# Patient Record
Sex: FEMALE | Race: WHITE | NOT HISPANIC OR LATINO
[De-identification: names, ages, dates, MRNs, and addresses within clinical notes are randomized per-mention and may not be internally consistent; named-entity substitution may affect disease eponyms.]

---

## 2017-05-02 ENCOUNTER — RESULT REVIEW (OUTPATIENT)
Age: 32
End: 2017-05-02

## 2017-05-03 ENCOUNTER — APPOINTMENT (OUTPATIENT)
Dept: DERMATOLOGY | Facility: CLINIC | Age: 32
End: 2017-05-03

## 2017-05-24 ENCOUNTER — APPOINTMENT (OUTPATIENT)
Dept: DERMATOLOGY | Facility: CLINIC | Age: 32
End: 2017-05-24

## 2017-06-30 ENCOUNTER — APPOINTMENT (OUTPATIENT)
Dept: DERMATOLOGY | Facility: CLINIC | Age: 32
End: 2017-06-30

## 2017-07-07 ENCOUNTER — APPOINTMENT (OUTPATIENT)
Dept: OBGYN | Facility: CLINIC | Age: 32
End: 2017-07-07

## 2017-07-07 VITALS
HEART RATE: 71 BPM | HEIGHT: 67 IN | WEIGHT: 155 LBS | SYSTOLIC BLOOD PRESSURE: 110 MMHG | DIASTOLIC BLOOD PRESSURE: 77 MMHG | BODY MASS INDEX: 24.33 KG/M2

## 2017-07-10 LAB — HPV HIGH+LOW RISK DNA PNL CVX: NEGATIVE

## 2017-07-12 LAB — CYTOLOGY CVX/VAG DOC THIN PREP: NORMAL

## 2017-09-05 ENCOUNTER — APPOINTMENT (OUTPATIENT)
Dept: DERMATOLOGY | Facility: CLINIC | Age: 32
End: 2017-09-05
Payer: MEDICAID

## 2017-09-05 PROCEDURE — 17110 DESTRUCTION B9 LES UP TO 14: CPT

## 2017-09-05 PROCEDURE — 99213 OFFICE O/P EST LOW 20 MIN: CPT | Mod: 25

## 2017-09-19 ENCOUNTER — RESULT REVIEW (OUTPATIENT)
Age: 32
End: 2017-09-19

## 2017-09-19 ENCOUNTER — INPATIENT (INPATIENT)
Facility: HOSPITAL | Age: 32
LOS: 0 days | Discharge: HOME | DRG: 694 | End: 2017-09-20
Attending: INTERNAL MEDICINE | Admitting: INTERNAL MEDICINE
Payer: MEDICAID

## 2017-09-19 VITALS
DIASTOLIC BLOOD PRESSURE: 81 MMHG | SYSTOLIC BLOOD PRESSURE: 126 MMHG | WEIGHT: 149.91 LBS | TEMPERATURE: 98 F | OXYGEN SATURATION: 100 % | HEART RATE: 82 BPM | HEIGHT: 67 IN | RESPIRATION RATE: 18 BRPM

## 2017-09-19 DIAGNOSIS — N23 UNSPECIFIED RENAL COLIC: ICD-10-CM

## 2017-09-19 DIAGNOSIS — R10.9 UNSPECIFIED ABDOMINAL PAIN: ICD-10-CM

## 2017-09-19 DIAGNOSIS — Z98.89 OTHER SPECIFIED POSTPROCEDURAL STATES: Chronic | ICD-10-CM

## 2017-09-19 LAB
ANION GAP SERPL CALC-SCNC: 11 MMOL/L — SIGNIFICANT CHANGE UP (ref 5–17)
APPEARANCE UR: ABNORMAL
BACTERIA # UR AUTO: ABNORMAL
BASOPHILS # BLD AUTO: 0 K/UL — SIGNIFICANT CHANGE UP (ref 0–0.2)
BASOPHILS NFR BLD AUTO: 0.4 % — SIGNIFICANT CHANGE UP (ref 0–2)
BILIRUB UR-MCNC: NEGATIVE — SIGNIFICANT CHANGE UP
BUN SERPL-MCNC: 12 MG/DL — SIGNIFICANT CHANGE UP (ref 8–20)
CALCIUM SERPL-MCNC: 9.2 MG/DL — SIGNIFICANT CHANGE UP (ref 8.6–10.2)
CHLORIDE SERPL-SCNC: 102 MMOL/L — SIGNIFICANT CHANGE UP (ref 98–107)
CO2 SERPL-SCNC: 26 MMOL/L — SIGNIFICANT CHANGE UP (ref 22–29)
COLOR SPEC: ABNORMAL
CREAT SERPL-MCNC: 0.75 MG/DL — SIGNIFICANT CHANGE UP (ref 0.5–1.3)
DIFF PNL FLD: ABNORMAL
EOSINOPHIL # BLD AUTO: 0.1 K/UL — SIGNIFICANT CHANGE UP (ref 0–0.5)
EOSINOPHIL NFR BLD AUTO: 1.6 % — SIGNIFICANT CHANGE UP (ref 0–6)
EPI CELLS # UR: SIGNIFICANT CHANGE UP
GLUCOSE SERPL-MCNC: 100 MG/DL — SIGNIFICANT CHANGE UP (ref 70–115)
GLUCOSE UR QL: NEGATIVE MG/DL — SIGNIFICANT CHANGE UP
HCG SERPL-ACNC: <2 MIU/ML — SIGNIFICANT CHANGE UP
HCT VFR BLD CALC: 38 % — SIGNIFICANT CHANGE UP (ref 37–47)
HGB BLD-MCNC: 13 G/DL — SIGNIFICANT CHANGE UP (ref 12–16)
KETONES UR-MCNC: ABNORMAL
LEUKOCYTE ESTERASE UR-ACNC: ABNORMAL
LYMPHOCYTES # BLD AUTO: 1.7 K/UL — SIGNIFICANT CHANGE UP (ref 1–4.8)
LYMPHOCYTES # BLD AUTO: 22.4 % — SIGNIFICANT CHANGE UP (ref 20–55)
MCHC RBC-ENTMCNC: 31.6 PG — HIGH (ref 27–31)
MCHC RBC-ENTMCNC: 34.2 G/DL — SIGNIFICANT CHANGE UP (ref 32–36)
MCV RBC AUTO: 92.2 FL — SIGNIFICANT CHANGE UP (ref 81–99)
MONOCYTES # BLD AUTO: 0.5 K/UL — SIGNIFICANT CHANGE UP (ref 0–0.8)
MONOCYTES NFR BLD AUTO: 7.2 % — SIGNIFICANT CHANGE UP (ref 3–10)
NEUTROPHILS # BLD AUTO: 5.1 K/UL — SIGNIFICANT CHANGE UP (ref 1.8–8)
NEUTROPHILS NFR BLD AUTO: 68.3 % — SIGNIFICANT CHANGE UP (ref 37–73)
NITRITE UR-MCNC: POSITIVE
PH UR: 6.5 — SIGNIFICANT CHANGE UP (ref 5–8)
PLATELET # BLD AUTO: 195 K/UL — SIGNIFICANT CHANGE UP (ref 150–400)
POTASSIUM SERPL-MCNC: 4.1 MMOL/L — SIGNIFICANT CHANGE UP (ref 3.5–5.3)
POTASSIUM SERPL-SCNC: 4.1 MMOL/L — SIGNIFICANT CHANGE UP (ref 3.5–5.3)
PROT UR-MCNC: 100 MG/DL
RBC # BLD: 4.12 M/UL — LOW (ref 4.4–5.2)
RBC # FLD: 12.5 % — SIGNIFICANT CHANGE UP (ref 11–15.6)
RBC CASTS # UR COMP ASSIST: >50 /HPF (ref 0–4)
SODIUM SERPL-SCNC: 139 MMOL/L — SIGNIFICANT CHANGE UP (ref 135–145)
SP GR SPEC: 1.01 — SIGNIFICANT CHANGE UP (ref 1.01–1.02)
UROBILINOGEN FLD QL: 1 MG/DL
WBC # BLD: 7.5 K/UL — SIGNIFICANT CHANGE UP (ref 4.8–10.8)
WBC # FLD AUTO: 7.5 K/UL — SIGNIFICANT CHANGE UP (ref 4.8–10.8)
WBC UR QL: SIGNIFICANT CHANGE UP

## 2017-09-19 PROCEDURE — 74176 CT ABD & PELVIS W/O CONTRAST: CPT | Mod: 26

## 2017-09-19 PROCEDURE — 99222 1ST HOSP IP/OBS MODERATE 55: CPT

## 2017-09-19 PROCEDURE — 88300 SURGICAL PATH GROSS: CPT | Mod: 26

## 2017-09-19 PROCEDURE — 99284 EMERGENCY DEPT VISIT MOD MDM: CPT | Mod: 25

## 2017-09-19 RX ORDER — MORPHINE SULFATE 50 MG/1
2 CAPSULE, EXTENDED RELEASE ORAL ONCE
Qty: 0 | Refills: 0 | Status: DISCONTINUED | OUTPATIENT
Start: 2017-09-19 | End: 2017-09-19

## 2017-09-19 RX ORDER — KETOROLAC TROMETHAMINE 30 MG/ML
30 SYRINGE (ML) INJECTION ONCE
Qty: 0 | Refills: 0 | Status: DISCONTINUED | OUTPATIENT
Start: 2017-09-19 | End: 2017-09-19

## 2017-09-19 RX ORDER — SODIUM CHLORIDE 9 MG/ML
1000 INJECTION, SOLUTION INTRAVENOUS
Qty: 0 | Refills: 0 | Status: DISCONTINUED | OUTPATIENT
Start: 2017-09-19 | End: 2017-09-20

## 2017-09-19 RX ORDER — SODIUM CHLORIDE 9 MG/ML
1000 INJECTION INTRAMUSCULAR; INTRAVENOUS; SUBCUTANEOUS ONCE
Qty: 0 | Refills: 0 | Status: COMPLETED | OUTPATIENT
Start: 2017-09-19 | End: 2017-09-19

## 2017-09-19 RX ORDER — OXYCODONE HYDROCHLORIDE 5 MG/1
10 TABLET ORAL EVERY 4 HOURS
Qty: 0 | Refills: 0 | Status: DISCONTINUED | OUTPATIENT
Start: 2017-09-19 | End: 2017-09-20

## 2017-09-19 RX ORDER — TAMSULOSIN HYDROCHLORIDE 0.4 MG/1
0.8 CAPSULE ORAL ONCE
Qty: 0 | Refills: 0 | Status: COMPLETED | OUTPATIENT
Start: 2017-09-19 | End: 2017-09-19

## 2017-09-19 RX ORDER — HYDROMORPHONE HYDROCHLORIDE 2 MG/ML
1 INJECTION INTRAMUSCULAR; INTRAVENOUS; SUBCUTANEOUS ONCE
Qty: 0 | Refills: 0 | Status: DISCONTINUED | OUTPATIENT
Start: 2017-09-19 | End: 2017-09-19

## 2017-09-19 RX ORDER — MORPHINE SULFATE 50 MG/1
3 CAPSULE, EXTENDED RELEASE ORAL EVERY 4 HOURS
Qty: 0 | Refills: 0 | Status: DISCONTINUED | OUTPATIENT
Start: 2017-09-19 | End: 2017-09-20

## 2017-09-19 RX ORDER — OXYCODONE HYDROCHLORIDE 5 MG/1
5 TABLET ORAL EVERY 4 HOURS
Qty: 0 | Refills: 0 | Status: DISCONTINUED | OUTPATIENT
Start: 2017-09-19 | End: 2017-09-20

## 2017-09-19 RX ORDER — INFLUENZA VIRUS VACCINE 15; 15; 15; 15 UG/.5ML; UG/.5ML; UG/.5ML; UG/.5ML
0.5 SUSPENSION INTRAMUSCULAR ONCE
Qty: 0 | Refills: 0 | Status: COMPLETED | OUTPATIENT
Start: 2017-09-19 | End: 2017-09-19

## 2017-09-19 RX ORDER — ONDANSETRON 8 MG/1
4 TABLET, FILM COATED ORAL EVERY 6 HOURS
Qty: 0 | Refills: 0 | Status: DISCONTINUED | OUTPATIENT
Start: 2017-09-19 | End: 2017-09-20

## 2017-09-19 RX ORDER — CEFTRIAXONE 500 MG/1
1 INJECTION, POWDER, FOR SOLUTION INTRAMUSCULAR; INTRAVENOUS EVERY 24 HOURS
Qty: 0 | Refills: 0 | Status: DISCONTINUED | OUTPATIENT
Start: 2017-09-19 | End: 2017-09-20

## 2017-09-19 RX ORDER — CEFTRIAXONE 500 MG/1
1 INJECTION, POWDER, FOR SOLUTION INTRAMUSCULAR; INTRAVENOUS ONCE
Qty: 0 | Refills: 0 | Status: COMPLETED | OUTPATIENT
Start: 2017-09-19 | End: 2017-09-19

## 2017-09-19 RX ORDER — MORPHINE SULFATE 50 MG/1
4 CAPSULE, EXTENDED RELEASE ORAL ONCE
Qty: 0 | Refills: 0 | Status: DISCONTINUED | OUTPATIENT
Start: 2017-09-19 | End: 2017-09-19

## 2017-09-19 RX ORDER — TAMSULOSIN HYDROCHLORIDE 0.4 MG/1
0.8 CAPSULE ORAL AT BEDTIME
Qty: 0 | Refills: 0 | Status: DISCONTINUED | OUTPATIENT
Start: 2017-09-19 | End: 2017-09-20

## 2017-09-19 RX ORDER — METOCLOPRAMIDE HCL 10 MG
10 TABLET ORAL ONCE
Qty: 0 | Refills: 0 | Status: COMPLETED | OUTPATIENT
Start: 2017-09-19 | End: 2017-09-19

## 2017-09-19 RX ORDER — ONDANSETRON 8 MG/1
4 TABLET, FILM COATED ORAL ONCE
Qty: 0 | Refills: 0 | Status: COMPLETED | OUTPATIENT
Start: 2017-09-19 | End: 2017-09-19

## 2017-09-19 RX ORDER — MORPHINE SULFATE 50 MG/1
4 CAPSULE, EXTENDED RELEASE ORAL EVERY 4 HOURS
Qty: 0 | Refills: 0 | Status: DISCONTINUED | OUTPATIENT
Start: 2017-09-19 | End: 2017-09-19

## 2017-09-19 RX ADMIN — Medication 30 MILLIGRAM(S): at 08:07

## 2017-09-19 RX ADMIN — ONDANSETRON 4 MILLIGRAM(S): 8 TABLET, FILM COATED ORAL at 20:50

## 2017-09-19 RX ADMIN — MORPHINE SULFATE 2 MILLIGRAM(S): 50 CAPSULE, EXTENDED RELEASE ORAL at 09:59

## 2017-09-19 RX ADMIN — Medication 10 MILLIGRAM(S): at 17:40

## 2017-09-19 RX ADMIN — OXYCODONE HYDROCHLORIDE 10 MILLIGRAM(S): 5 TABLET ORAL at 17:40

## 2017-09-19 RX ADMIN — ONDANSETRON 4 MILLIGRAM(S): 8 TABLET, FILM COATED ORAL at 08:07

## 2017-09-19 RX ADMIN — MORPHINE SULFATE 4 MILLIGRAM(S): 50 CAPSULE, EXTENDED RELEASE ORAL at 08:08

## 2017-09-19 RX ADMIN — MORPHINE SULFATE 2 MILLIGRAM(S): 50 CAPSULE, EXTENDED RELEASE ORAL at 11:01

## 2017-09-19 RX ADMIN — ONDANSETRON 4 MILLIGRAM(S): 8 TABLET, FILM COATED ORAL at 14:05

## 2017-09-19 RX ADMIN — MORPHINE SULFATE 3 MILLIGRAM(S): 50 CAPSULE, EXTENDED RELEASE ORAL at 21:06

## 2017-09-19 RX ADMIN — Medication 30 MILLIGRAM(S): at 08:08

## 2017-09-19 RX ADMIN — HYDROMORPHONE HYDROCHLORIDE 1 MILLIGRAM(S): 2 INJECTION INTRAMUSCULAR; INTRAVENOUS; SUBCUTANEOUS at 13:39

## 2017-09-19 RX ADMIN — MORPHINE SULFATE 3 MILLIGRAM(S): 50 CAPSULE, EXTENDED RELEASE ORAL at 20:51

## 2017-09-19 RX ADMIN — SODIUM CHLORIDE 150 MILLILITER(S): 9 INJECTION, SOLUTION INTRAVENOUS at 17:41

## 2017-09-19 RX ADMIN — SODIUM CHLORIDE 1000 MILLILITER(S): 9 INJECTION INTRAMUSCULAR; INTRAVENOUS; SUBCUTANEOUS at 08:07

## 2017-09-19 RX ADMIN — MORPHINE SULFATE 4 MILLIGRAM(S): 50 CAPSULE, EXTENDED RELEASE ORAL at 15:13

## 2017-09-19 RX ADMIN — HYDROMORPHONE HYDROCHLORIDE 1 MILLIGRAM(S): 2 INJECTION INTRAMUSCULAR; INTRAVENOUS; SUBCUTANEOUS at 11:46

## 2017-09-19 RX ADMIN — TAMSULOSIN HYDROCHLORIDE 0.8 MILLIGRAM(S): 0.4 CAPSULE ORAL at 11:46

## 2017-09-19 RX ADMIN — MORPHINE SULFATE 4 MILLIGRAM(S): 50 CAPSULE, EXTENDED RELEASE ORAL at 14:05

## 2017-09-19 RX ADMIN — TAMSULOSIN HYDROCHLORIDE 0.8 MILLIGRAM(S): 0.4 CAPSULE ORAL at 20:58

## 2017-09-19 RX ADMIN — SODIUM CHLORIDE 1000 MILLILITER(S): 9 INJECTION INTRAMUSCULAR; INTRAVENOUS; SUBCUTANEOUS at 13:40

## 2017-09-19 RX ADMIN — CEFTRIAXONE 100 GRAM(S): 500 INJECTION, POWDER, FOR SOLUTION INTRAMUSCULAR; INTRAVENOUS at 09:55

## 2017-09-19 NOTE — CONSULT NOTE ADULT - PROBLEM SELECTOR RECOMMENDATION 9
1.  IV rocephin, swithch to po abx tomorrow  2.  IVF  3.  FLomax  4.  pain control  5.  FU UC  6.  If has fever or the pain doesn't resolve she may require a ureteral stent  7. Will follow

## 2017-09-19 NOTE — ED STATDOCS - OBJECTIVE STATEMENT
31 year old female, with PMHx of kidney stone 6 years ago, presenting to the ED complaining of right flank pain. Pt states that she also has had episodes of vomiting. She states that her sx feel similar to her prior hx of kidney stone. Pt denies pregnancy. She states that she is not on any medications currently. Pt states that she does not smoke, drink alcohol, or use illicit drugs. She denies neck pain, ear pain, eye pain, HA, abdominal pain, throat pain, or chest pain. No further complaints at this time. 31 year old female, with PMHx of kidney stone 6 years ago, presenting to the ED complaining of right flank pain. She rates her pain at 10/10. Pt states that she also has had episodes of vomiting. She states that her sx feel similar to her prior hx of kidney stone. Pt denies pregnancy. She states that she is not on any medications currently. Pt states that she does not smoke, drink alcohol, or use illicit drugs. She denies neck pain, ear pain, eye pain, HA, abdominal pain, throat pain, or chest pain. No further complaints at this time.

## 2017-09-19 NOTE — ED ADULT TRIAGE NOTE - CHIEF COMPLAINT QUOTE
"I think I have a kidney stone on my right side. I had them before and I've been vomiting since this morning.  I have the chills" LMP two weeks ago

## 2017-09-19 NOTE — ED STATDOCS - PROGRESS NOTE DETAILS
patient re-evaluated c/o R flank pain x 1 day, woke up with pain and nausea and "dry heaving."  Patient has h/o stones x 7 years ago and reports pain feels "exactly the same way."  She denies any fevers or chills, recent travel or rashes or sick contacts.  PE: +RCVAT, no masses or hernias, +BS x4, soft.  Pending labs, UA, and CT to eval for hydro/obstructing kidney stone. patient states toradol never works for her requesting morphine. patient now rates pain at 5/10. patients labs and UA reviewed, pending CT, UA +nitrates/blood, hematuria likely from renal pathology, will tx with 1G rocephin and send UC CT +3mm obstructing renal stone in UVJ, UA nitrate + concern for infected stone, patient requesting more pain medication.  Will consult urology. spoke to urology shayy, no indication for stent, as patient has no WBC or fever, recommend patient to be given option to be admitted for observation or d/c home with abx, pain meds, flomax, if symptoms worsen return to ED.

## 2017-09-19 NOTE — H&P ADULT - NSHPPHYSICALEXAM_GEN_ALL_CORE
PHYSICAL EXAMINATION:  GENERAL: NAD, Alert and Oriented x 3 HEENT:  Normocephalic and atraumatic.  Extraocular muscles are intact.  NECK: Supple.  - JVD.  CARDIOVASCULAR: RRR S1, S2.  LUNGS: CTAB, - rales, - wheezing, - rhonchi.  BACK: - CVA tenderness.  ABDOMEN: Soft, - tenderness, - distension, + BS.  EXTREMITIES: - cyanosis, - clubbing, - edema.  NEUROLOGIC: strength is symmetric, sensation intact, speech fluent.  PSYCHIATRIC: Calm.  - agitation.    SKIN: - rashes, - lesions.

## 2017-09-19 NOTE — CONSULT NOTE ADULT - SUBJECTIVE AND OBJECTIVE BOX
HPI: Patient with acute right flank pain.  Pain was 10/10 in severity.  The pain was responsive to IV pain medication.  No fevers.  + chills.  Feels slightly better in the ED.  Had eswl 14years ago.        PAST MEDICAL & SURGICAL HISTORY:  ADD (attention deficit disorder)  Kidney stone  S/P       REVIEW OF SYSTEMS:     Reviewed ED ROS    MEDICATIONS  (STANDING):    MEDICATIONS  (PRN):      Allergies    No Known Allergies    Intolerances        SOCIAL HISTORY:    FAMILY HISTORY:      Vital Signs Last 24 Hrs  T(C): 36.6 (19 Sep 2017 07:28), Max: 36.6 (19 Sep 2017 07:28)  T(F): 97.8 (19 Sep 2017 07:28), Max: 97.8 (19 Sep 2017 07:28)  HR: 82 (19 Sep 2017 07:28) (82 - 82)  BP: 126/81 (19 Sep 2017 07:28) (126/81 - 126/81)  BP(mean): --  RR: 18 (19 Sep 2017 07:28) (18 - 18)  SpO2: 100% (19 Sep 2017 07:28) (100% - 100%)    PHYSICAL EXAM:     abd- soft, nd, bs+, Mild ruq tenderness and flank tenderness    LABS:                        13.0   7.5   )-----------( 195      ( 19 Sep 2017 08:17 )             38.0     -    139  |  102  |  12.0  ----------------------------<  100  4.1   |  26.0  |  0.75    Ca    9.2      19 Sep 2017 08:17        Urinalysis Basic - ( 19 Sep 2017 09:14 )    Color: Lucia / Appearance: x / S.015 / pH: x  Gluc: x / Ketone: Trace  / Bili: Negative / Urobili: 1 mg/dL   Blood: x / Protein: 100 mg/dL / Nitrite: Positive   Leuk Esterase: Trace / RBC: >50 /HPF / WBC 3-5   Sq Epi: x / Non Sq Epi: Few / Bacteria: Few        RADIOLOGY & ADDITIONAL STUDIES:

## 2017-09-19 NOTE — ED STATDOCS - CHPI ED SYMPTOM NEG
NO NECK PAIN, NO EAR PAIN, NO THROAT PAIN, NO EYE PAIN, NO HEADACHE, NO ABDOMINAL PAIN, NO CHEST PAIN

## 2017-09-19 NOTE — H&P ADULT - HISTORY OF PRESENT ILLNESS
31y Female PMH ADHD, renal stones presents c/o R flank pain x 2 days, progressively worsening, associated with subjective chills and nausea.  No diarrhea.  no exacerbating or relieving factors.  Symptoms similar to her prior experiences of renal stones per pt.  Noted on CT with R 3mm ureteral stone. UA + nitrates.  No additional complaints.     FAMILY HISTORY: reviewed and negative.  SOCIAL HISTORY: - alcohol, - IVDA, - smoking.  REVIEW OF SYSTEMS: General: - fatigue, - weight loss, - fevers, - chills.  HEENT: - headache, - hearing disturbances.  EYES: - visual disturbances, - diplopia.  CARDIOVASCULAR: - chest pain, - palpitations.  PULMONARY: - SOB, - cough, - hemoptysis.  GI: - abdominal pain, - nausea, - vomiting, - diarrhea, - constipation.  : - urinary urgency, - frequency, - dysuria.  MUSCULOSKELETAL: - arthralgias, - myalgias.  NEURO:  - weakness, - numbness.  PSYCH: - depression, - anxiety, - suicidal thoughts. SKIN: - rashes, - lesions.  Allergies    No Known Allergies    Intolerances

## 2017-09-19 NOTE — H&P ADULT - NSHPLABSRESULTS_GEN_ALL_CORE
VITALS:  Vital Signs Last 24 Hrs  T(C): 36.6 (19 Sep 2017 07:28), Max: 36.6 (19 Sep 2017 07:28)  T(F): 97.8 (19 Sep 2017 07:28), Max: 97.8 (19 Sep 2017 07:28)  HR: 82 (19 Sep 2017 07:28) (82 - 82)  BP: 126/81 (19 Sep 2017 07:28) (126/81 - 126/81)  BP(mean): --  RR: 18 (19 Sep 2017 07:28) (18 - 18)  SpO2: 100% (19 Sep 2017 07:28) (100% - 100%) Daily Height in cm: 170.18 (19 Sep 2017 07:28)    Daily   CAPILLARY BLOOD GLUCOSE        I&O's Summary      LABS:                        13.0   7.5   )-----------( 195      ( 19 Sep 2017 08:17 )             38.0     09-    139  |  102  |  12.0  ----------------------------<  100  4.1   |  26.0  |  0.75    Ca    9.2      19 Sep 2017 08:17          Urinalysis Basic - ( 19 Sep 2017 09:14 )    Color: Lucia / Appearance: x / S.015 / pH: x  Gluc: x / Ketone: Trace  / Bili: Negative / Urobili: 1 mg/dL   Blood: x / Protein: 100 mg/dL / Nitrite: Positive   Leuk Esterase: Trace / RBC: >50 /HPF / WBC 3-5   Sq Epi: x / Non Sq Epi: Few / Bacteria: Few

## 2017-09-19 NOTE — H&P ADULT - ASSESSMENT
> R ureteral stone / UTI - urology input appreciated.  Started Flomax, IVF.  Pain control with oxycodone, Morphine prn.  Possible further intervention if symptoms do not improve.  Checked cultures.  > ADHD - off medications.  Observation.  > DVT Proph OOB.

## 2017-09-19 NOTE — ED STATDOCS - ATTENDING CONTRIBUTION TO CARE
I, Rojas Ga, performed the initial face to face bedside interview with this patient regarding history of present illness, review of symptoms and relevant past medical, social and family history.  I completed an independent physical examination.  I was the initial provider who evaluated this patient. I have signed out the follow up of any pending tests (i.e. labs, radiological studies) to the ACP.  I have communicated the patient’s plan of care and disposition with the ACP.

## 2017-09-20 ENCOUNTER — TRANSCRIPTION ENCOUNTER (OUTPATIENT)
Age: 32
End: 2017-09-20

## 2017-09-20 VITALS — TEMPERATURE: 98 F

## 2017-09-20 LAB
ANION GAP SERPL CALC-SCNC: 9 MMOL/L — SIGNIFICANT CHANGE UP (ref 5–17)
BUN SERPL-MCNC: 13 MG/DL — SIGNIFICANT CHANGE UP (ref 8–20)
CALCIUM SERPL-MCNC: 8.3 MG/DL — LOW (ref 8.6–10.2)
CHLORIDE SERPL-SCNC: 104 MMOL/L — SIGNIFICANT CHANGE UP (ref 98–107)
CO2 SERPL-SCNC: 25 MMOL/L — SIGNIFICANT CHANGE UP (ref 22–29)
CREAT SERPL-MCNC: 0.81 MG/DL — SIGNIFICANT CHANGE UP (ref 0.5–1.3)
CULTURE RESULTS: SIGNIFICANT CHANGE UP
GLUCOSE SERPL-MCNC: 101 MG/DL — SIGNIFICANT CHANGE UP (ref 70–115)
HCT VFR BLD CALC: 32 % — LOW (ref 37–47)
HGB BLD-MCNC: 10.6 G/DL — LOW (ref 12–16)
MCHC RBC-ENTMCNC: 31.3 PG — HIGH (ref 27–31)
MCHC RBC-ENTMCNC: 33.1 G/DL — SIGNIFICANT CHANGE UP (ref 32–36)
MCV RBC AUTO: 94.4 FL — SIGNIFICANT CHANGE UP (ref 81–99)
PLATELET # BLD AUTO: 139 K/UL — LOW (ref 150–400)
POTASSIUM SERPL-MCNC: 3.9 MMOL/L — SIGNIFICANT CHANGE UP (ref 3.5–5.3)
POTASSIUM SERPL-SCNC: 3.9 MMOL/L — SIGNIFICANT CHANGE UP (ref 3.5–5.3)
RBC # BLD: 3.39 M/UL — LOW (ref 4.4–5.2)
RBC # FLD: 12.6 % — SIGNIFICANT CHANGE UP (ref 11–15.6)
SODIUM SERPL-SCNC: 138 MMOL/L — SIGNIFICANT CHANGE UP (ref 135–145)
SPECIMEN SOURCE: SIGNIFICANT CHANGE UP
WBC # BLD: 5.7 K/UL — SIGNIFICANT CHANGE UP (ref 4.8–10.8)
WBC # FLD AUTO: 5.7 K/UL — SIGNIFICANT CHANGE UP (ref 4.8–10.8)

## 2017-09-20 PROCEDURE — 99238 HOSP IP/OBS DSCHRG MGMT 30/<: CPT

## 2017-09-20 RX ORDER — MOXIFLOXACIN HYDROCHLORIDE TABLETS, 400 MG 400 MG/1
1 TABLET, FILM COATED ORAL
Qty: 14 | Refills: 0
Start: 2017-09-20 | End: 2017-09-27

## 2017-09-20 RX ORDER — TAMSULOSIN HYDROCHLORIDE 0.4 MG/1
2 CAPSULE ORAL
Qty: 20 | Refills: 0
Start: 2017-09-20 | End: 2017-09-30

## 2017-09-20 RX ORDER — SACCHAROMYCES BOULARDII 250 MG
1 POWDER IN PACKET (EA) ORAL
Qty: 20 | Refills: 0
Start: 2017-09-20 | End: 2017-09-30

## 2017-09-20 RX ORDER — OXYCODONE HYDROCHLORIDE 5 MG/1
1 TABLET ORAL
Qty: 20 | Refills: 0
Start: 2017-09-20

## 2017-09-20 RX ADMIN — CEFTRIAXONE 100 GRAM(S): 500 INJECTION, POWDER, FOR SOLUTION INTRAMUSCULAR; INTRAVENOUS at 10:10

## 2017-09-20 RX ADMIN — SODIUM CHLORIDE 150 MILLILITER(S): 9 INJECTION, SOLUTION INTRAVENOUS at 05:34

## 2017-09-20 RX ADMIN — OXYCODONE HYDROCHLORIDE 5 MILLIGRAM(S): 5 TABLET ORAL at 10:10

## 2017-09-20 RX ADMIN — OXYCODONE HYDROCHLORIDE 5 MILLIGRAM(S): 5 TABLET ORAL at 10:50

## 2017-09-20 NOTE — DISCHARGE NOTE ADULT - PATIENT PORTAL LINK FT
“You can access the Follow[employer] Patient Portal, offered by [facility], by registering with the following website: [email or web address]”

## 2017-09-20 NOTE — PROGRESS NOTE ADULT - SUBJECTIVE AND OBJECTIVE BOX
INTERVAL HPI/OVERNIGHT EVENTS: Feels well.  Minimal pain.  No n/v.    MEDICATIONS  (STANDING):  tamsulosin 0.8 milliGRAM(s) Oral at bedtime  sodium chloride 0.45%. 1000 milliLiter(s) (150 mL/Hr) IV Continuous <Continuous>  cefTRIAXone   IVPB 1 Gram(s) IV Intermittent every 24 hours  influenza   Vaccine 0.5 milliLiter(s) IntraMuscular once    MEDICATIONS  (PRN):  ondansetron Injectable 4 milliGRAM(s) IV Push every 6 hours PRN Nausea and/or Vomiting  morphine  - Injectable 3 milliGRAM(s) IV Push every 4 hours PRN Breakthrough pain  oxyCODONE    IR 5 milliGRAM(s) Oral every 4 hours PRN mild - moderate pain  oxyCODONE    IR 10 milliGRAM(s) Oral every 4 hours PRN Severe Pain (7 - 10)      Allergies    No Known Allergies    Intolerances        Vital Signs Last 24 Hrs  T(C): 36.5 (20 Sep 2017 00:29), Max: 36.6 (19 Sep 2017 07:28)  T(F): 97.7 (20 Sep 2017 00:29), Max: 97.8 (19 Sep 2017 07:28)  HR: 75 (20 Sep 2017 00:29) (60 - 82)  BP: 90/47 (20 Sep 2017 00:29) (90/47 - 126/81)  BP(mean): --  RR: 18 (20 Sep 2017 00:29) (18 - 18)  SpO2: 98% (20 Sep 2017 00:29) (98% - 100%)     ON PE:  General: alert and awake  Abdomen: soft, nd, bs+, mild ruq tenderness       LABS:                        13.0   7.5   )-----------( 195      ( 19 Sep 2017 08:17 )             38.0     -    139  |  102  |  12.0  ----------------------------<  100  4.1   |  26.0  |  0.75    Ca    9.2      19 Sep 2017 08:17        Urinalysis Basic - ( 19 Sep 2017 09:14 )    Color: Lucia / Appearance: x / S.015 / pH: x  Gluc: x / Ketone: Trace  / Bili: Negative / Urobili: 1 mg/dL   Blood: x / Protein: 100 mg/dL / Nitrite: Positive   Leuk Esterase: Trace / RBC: >50 /HPF / WBC 3-5   Sq Epi: x / Non Sq Epi: Few / Bacteria: Few        RADIOLOGY & ADDITIONAL TESTS:

## 2017-09-20 NOTE — DISCHARGE NOTE ADULT - HOSPITAL COURSE
Patient: HAMILTON MOTLEY [id number]                 Internal Medicine Hospitalist Discharge Note - Dr. Arnold Coto    HPI:  31y Female PMH ADHD, renal stones presents c/o R flank pain x 2 days, progressively worsening, associated with subjective chills and nausea.  No diarrhea.  no exacerbating or relieving factors.  Symptoms similar to her prior experiences of renal stones per pt.  Noted on CT with R 3mm ureteral stone. UA + nitrates.  Responded well to medical management.  Seen with mother at bedside.  Pt reports passing a small stone overnight.  Symptoms significantly improved.      ____________________PHYSICAL EXAM:  Vitals reviewed as indicated below  GENERAL:  NAD Alert and Oriented x 3   HEENT: NCAT  CARDIOVASCULAR:  S1, S2  LUNGS: CTAB  ABDOMEN:  soft, (-) tenderness, (-) distension, (+) bowel sounds, (-) guarding, (-) rebound (-) rigidity  EXTREMITIES:  no cyanosis / clubbing / edema.   ____________________    VITALS:  Vital Signs Last 24 Hrs  T(C): 36.6 (20 Sep 2017 08:34), Max: 36.6 (20 Sep 2017 08:34)  T(F): 97.8 (20 Sep 2017 08:34), Max: 97.8 (20 Sep 2017 08:34)  HR: 62 (20 Sep 2017 08:34) (60 - 75)  BP: 90/60 (20 Sep 2017 08:34) (90/47 - 99/64)  BP(mean): --  RR: 18 (20 Sep 2017 08:34) (18 - 18)  SpO2: 99% (20 Sep 2017 08:34) (98% - 100%) Daily     Daily   CAPILLARY BLOOD GLUCOSE        I&O's Summary    19 Sep 2017 07:  -  20 Sep 2017 07:00  --------------------------------------------------------  IN: 150 mL / OUT: 0 mL / NET: 150 mL    20 Sep 2017 07:01  -  20 Sep 2017 10:31  --------------------------------------------------------  IN: 500 mL / OUT: 0 mL / NET: 500 mL        LABS:                        13.0   7.5   )-----------( 195      ( 19 Sep 2017 08:17 )             38.0     09-20    138  |  104  |  13.0  ----------------------------<  101  3.9   |  25.0  |  0.81    Ca    8.3<L>      20 Sep 2017 08:30            Urinalysis Basic - ( 19 Sep 2017 09:14 )    Color: Lucia / Appearance: x / S.015 / pH: x  Gluc: x / Ketone: Trace  / Bili: Negative / Urobili: 1 mg/dL   Blood: x / Protein: 100 mg/dL / Nitrite: Positive   Leuk Esterase: Trace / RBC: >50 /HPF / WBC 3-5   Sq Epi: x / Non Sq Epi: Few / Bacteria: Few          > R ureteral stone / UTI - Urology f/u noted.  Outpatient f/u for stone analysis results.  Continue Cipro.  Advised not to become pregnant on medication.    > ADHD - off medications.  Observation.  Disposition: stable for discharge.  Outpatient followup as above.  Patient was advised to return if they experience any recurrence or worsening of symptoms.    -Arnold Coto D.O., Hospitalist, [facility]

## 2017-09-20 NOTE — DISCHARGE NOTE ADULT - CARE PLAN
Principal Discharge DX:	Kidney stone  Goal:	stable for discharge  Instructions for follow-up, activity and diet:	It is important to see your primary physician as well as the physicians noted below within the next week to perform a comprehensive medical review.  Call their offices for an appointment as soon as you leave the hospital.  If you do not have a primary physician, contact the [facility] [place] [phone] located on [address] [place] [address].  Your medical issues appear to be stable at this time, but if your symptoms recur or worsen, contact your physicians and/or return to the hospital if necessary.  If you encounter any issues or questions with your medication, call your physicians before stopping the medication.  Use caution with oxycodone as it may include somnolence and impaired cognition, especially if used in combination with other medications.  Do not drive / operate machinery / etc while on this medication.  Secondary Diagnosis:	Attention deficit disorder, unspecified hyperactivity presence  Secondary Diagnosis:	Urinary tract infection without hematuria, site unspecified

## 2017-09-20 NOTE — PROGRESS NOTE ADULT - PROBLEM SELECTOR PLAN 1
pain control as needed  po abx x 5 days  discharge home  FU with me in the office in two weeks  will sign off

## 2017-09-20 NOTE — DISCHARGE NOTE ADULT - CARE PROVIDER_API CALL
Arnold Estrada), Family Medicine  [address] [place] [address]  Phone: [phone]  Fax: [phone]    Vazquez Verde), Urology  [address] [place] [id number]  Phone: [phone]  Fax: [phone]

## 2017-09-20 NOTE — DISCHARGE NOTE ADULT - MEDICATION SUMMARY - MEDICATIONS TO TAKE
I will START or STAY ON the medications listed below when I get home from the hospital:    oxyCODONE 5 mg oral tablet  -- 1 tab(s) by mouth every 6 hours, As Needed - moderate - severe pain MDD:4  -- Indication: For Pain    tamsulosin 0.4 mg oral capsule  -- 2 cap(s) by mouth once a day (at bedtime)  -- Indication: For Renal colic    Adderall  --  by mouth   -- Indication: For ADHD    Florastor 250 mg oral capsule  -- 1 cap(s) by mouth 2 times a day   -- Indication: For Supplement    Cipro 500 mg oral tablet  -- 1 tab(s) by mouth every 12 hours   -- Avoid prolonged or excessive exposure to direct and/or artificial sunlight while taking this medication.  Check with your doctor before becoming pregnant.  Do not take dairy products, antacids, or iron preparations within one hour of this medication.  Finish all this medication unless otherwise directed by prescriber.  Medication should be taken with plenty of water.    -- Indication: For UTI

## 2017-09-20 NOTE — DISCHARGE NOTE ADULT - SECONDARY DIAGNOSIS.
Attention deficit disorder, unspecified hyperactivity presence Urinary tract infection without hematuria, site unspecified

## 2017-09-20 NOTE — DISCHARGE NOTE ADULT - PLAN OF CARE
stable for discharge It is important to see your primary physician as well as the physicians noted below within the next week to perform a comprehensive medical review.  Call their offices for an appointment as soon as you leave the hospital.  If you do not have a primary physician, contact the [facility] at [place] [phone] located on [address] [place] [address].  Your medical issues appear to be stable at this time, but if your symptoms recur or worsen, contact your physicians and/or return to the hospital if necessary.  If you encounter any issues or questions with your medication, call your physicians before stopping the medication.  Use caution with oxycodone as it may include somnolence and impaired cognition, especially if used in combination with other medications.  Do not drive / operate machinery / etc while on this medication.

## 2017-09-27 LAB
COMPN STONE: SIGNIFICANT CHANGE UP
SURGICAL PATHOLOGY FINAL REPORT - CH: SIGNIFICANT CHANGE UP

## 2017-09-27 PROCEDURE — 96376 TX/PRO/DX INJ SAME DRUG ADON: CPT

## 2017-09-27 PROCEDURE — 99285 EMERGENCY DEPT VISIT HI MDM: CPT | Mod: 25

## 2017-09-27 PROCEDURE — 74176 CT ABD & PELVIS W/O CONTRAST: CPT

## 2017-09-27 PROCEDURE — 96375 TX/PRO/DX INJ NEW DRUG ADDON: CPT

## 2017-09-27 PROCEDURE — 80048 BASIC METABOLIC PNL TOTAL CA: CPT

## 2017-09-27 PROCEDURE — 82365 CALCULUS SPECTROSCOPY: CPT

## 2017-09-27 PROCEDURE — 87086 URINE CULTURE/COLONY COUNT: CPT

## 2017-09-27 PROCEDURE — 84702 CHORIONIC GONADOTROPIN TEST: CPT

## 2017-09-27 PROCEDURE — 88300 SURGICAL PATH GROSS: CPT

## 2017-09-27 PROCEDURE — 85027 COMPLETE CBC AUTOMATED: CPT

## 2017-09-27 PROCEDURE — 81001 URINALYSIS AUTO W/SCOPE: CPT

## 2017-09-27 PROCEDURE — 36415 COLL VENOUS BLD VENIPUNCTURE: CPT

## 2017-09-27 PROCEDURE — 96374 THER/PROPH/DIAG INJ IV PUSH: CPT

## 2017-09-29 LAB — PATH REPORT ADDENDUM.SYNOPTIC DOC: SIGNIFICANT CHANGE UP

## 2017-10-27 ENCOUNTER — APPOINTMENT (OUTPATIENT)
Dept: DERMATOLOGY | Facility: CLINIC | Age: 32
End: 2017-10-27
Payer: MEDICAID

## 2017-10-27 PROCEDURE — 99213 OFFICE O/P EST LOW 20 MIN: CPT

## 2018-02-05 ENCOUNTER — APPOINTMENT (OUTPATIENT)
Dept: OBGYN | Facility: CLINIC | Age: 33
End: 2018-02-05
Payer: MEDICAID

## 2018-02-05 VITALS
WEIGHT: 155 LBS | DIASTOLIC BLOOD PRESSURE: 82 MMHG | HEIGHT: 67 IN | SYSTOLIC BLOOD PRESSURE: 126 MMHG | BODY MASS INDEX: 24.33 KG/M2

## 2018-02-05 DIAGNOSIS — N97.9 FEMALE INFERTILITY, UNSPECIFIED: ICD-10-CM

## 2018-02-05 PROCEDURE — 99213 OFFICE O/P EST LOW 20 MIN: CPT

## 2018-05-09 ENCOUNTER — APPOINTMENT (OUTPATIENT)
Dept: DERMATOLOGY | Facility: CLINIC | Age: 33
End: 2018-05-09

## 2018-05-30 ENCOUNTER — APPOINTMENT (OUTPATIENT)
Dept: NEUROLOGY | Facility: CLINIC | Age: 33
End: 2018-05-30

## 2018-07-08 ENCOUNTER — EMERGENCY (EMERGENCY)
Facility: HOSPITAL | Age: 33
LOS: 1 days | Discharge: HOME | End: 2018-07-08
Attending: EMERGENCY MEDICINE
Payer: MEDICAID

## 2018-07-08 VITALS — WEIGHT: 149.91 LBS | HEIGHT: 67 IN

## 2018-07-08 DIAGNOSIS — Z98.89 OTHER SPECIFIED POSTPROCEDURAL STATES: Chronic | ICD-10-CM

## 2018-07-08 PROCEDURE — 99283 EMERGENCY DEPT VISIT LOW MDM: CPT | Mod: 25

## 2018-07-09 VITALS
RESPIRATION RATE: 16 BRPM | SYSTOLIC BLOOD PRESSURE: 114 MMHG | DIASTOLIC BLOOD PRESSURE: 85 MMHG | OXYGEN SATURATION: 100 % | TEMPERATURE: 98 F | HEART RATE: 85 BPM

## 2018-07-09 PROCEDURE — 99283 EMERGENCY DEPT VISIT LOW MDM: CPT

## 2018-07-09 RX ORDER — PSEUDOEPHEDRINE HCL 30 MG
1 TABLET ORAL
Qty: 8 | Refills: 0
Start: 2018-07-09 | End: 2018-07-12

## 2018-07-09 RX ORDER — PSEUDOEPHEDRINE HCL 30 MG
30 TABLET ORAL ONCE
Qty: 0 | Refills: 0 | Status: COMPLETED | OUTPATIENT
Start: 2018-07-09 | End: 2018-07-09

## 2018-07-09 RX ADMIN — Medication 1 TABLET(S): at 02:55

## 2018-07-09 RX ADMIN — Medication 30 MILLIGRAM(S): at 02:55

## 2018-07-09 NOTE — ED PROVIDER NOTE - NS ED ROS FT

## 2018-07-09 NOTE — ED PROVIDER NOTE - OBJECTIVE STATEMENT
PT with no SPMHx presents to the ED with complaint of Lt sided ear pain x1 wk. Pt states that she went to [facility] was placed on PO amoxicillin and ear drops with little to no improvement 5 days ago, pt was seen at PCP 3 days ago taken off PO abx given pain medication told to go to ED if worse, pt went to [facility] earlier today for pain was given ear wick that helped with pain but fell out at home, pt states that pain is sever at this time. PT states that pain improved positionally PT states that pain radiates all around her head, denies fever, chills, n/v, weka ness, numbness, acute change in hearing, change in vision, skin changes.

## 2018-07-09 NOTE — ED PROVIDER NOTE - ATTENDING CONTRIBUTION TO CARE
I personally saw the patient with the PA, and completed the key components of the history and physical exam. I then discussed the management plan with the PA.   gen in nad resp clear cardaic no murmur abd soft heeent + left OE no mastoid erythema swelling

## 2018-07-09 NOTE — ED PROVIDER NOTE - MEDICAL DECISION MAKING DETAILS
PT with stable vs, non toxic appearing tolerating PO in the ed with large amount of swelling in external canal will place wick, continue abx drops add po medications dc home with follow up to PCP, ENT, educated about when to return to the ED if needed. PT verbalizes that he understands all instructions and results.

## 2018-07-09 NOTE — ED PROVIDER NOTE - LEFT EAR
EXTERNAL CANAL INFLAMMATION/unable to visualize all ansotomy due to large amount of swelling, no drainages, no mastoid tenderness or inflammation

## 2018-07-10 ENCOUNTER — APPOINTMENT (OUTPATIENT)
Dept: OTOLARYNGOLOGY | Facility: CLINIC | Age: 33
End: 2018-07-10
Payer: MEDICAID

## 2018-07-10 VITALS
SYSTOLIC BLOOD PRESSURE: 107 MMHG | HEART RATE: 79 BPM | HEIGHT: 67 IN | WEIGHT: 155 LBS | BODY MASS INDEX: 24.33 KG/M2 | DIASTOLIC BLOOD PRESSURE: 75 MMHG

## 2018-07-10 DIAGNOSIS — Z87.442 PERSONAL HISTORY OF URINARY CALCULI: ICD-10-CM

## 2018-07-10 DIAGNOSIS — Z98.891 HISTORY OF UTERINE SCAR FROM PREVIOUS SURGERY: ICD-10-CM

## 2018-07-10 PROCEDURE — 99203 OFFICE O/P NEW LOW 30 MIN: CPT

## 2018-07-10 RX ORDER — AMOXICILLIN 875 MG/1
875 TABLET, FILM COATED ORAL
Qty: 20 | Refills: 0 | Status: DISCONTINUED | COMMUNITY
Start: 2018-07-05

## 2018-07-10 RX ORDER — DICLOXACILLIN SODIUM 500 MG/1
500 CAPSULE ORAL
Qty: 40 | Refills: 0 | Status: DISCONTINUED | COMMUNITY
Start: 2018-07-07

## 2018-07-10 RX ORDER — AMITRIPTYLINE HYDROCHLORIDE 25 MG/1
25 TABLET, FILM COATED ORAL
Qty: 30 | Refills: 0 | Status: DISCONTINUED | COMMUNITY
Start: 2018-01-24

## 2018-07-10 RX ORDER — AMOXICILLIN 500 MG/1
500 CAPSULE ORAL
Qty: 20 | Refills: 0 | Status: DISCONTINUED | COMMUNITY
Start: 2018-05-10

## 2018-07-17 ENCOUNTER — APPOINTMENT (OUTPATIENT)
Dept: OTOLARYNGOLOGY | Facility: CLINIC | Age: 33
End: 2018-07-17
Payer: MEDICAID

## 2018-07-17 VITALS
WEIGHT: 150 LBS | HEART RATE: 80 BPM | HEIGHT: 66 IN | SYSTOLIC BLOOD PRESSURE: 112 MMHG | BODY MASS INDEX: 24.11 KG/M2 | DIASTOLIC BLOOD PRESSURE: 73 MMHG

## 2018-07-17 PROCEDURE — 99213 OFFICE O/P EST LOW 20 MIN: CPT

## 2018-07-17 RX ORDER — LEVONORGESTREL AND ETHINYL ESTRADIOL 6-5-10
50-30/75-40/ KIT ORAL DAILY
Qty: 90 | Refills: 3 | Status: DISCONTINUED | COMMUNITY
Start: 2017-09-18 | End: 2018-07-17

## 2018-07-17 RX ORDER — NEOMYCIN AND POLYMYXIN B SULFATES AND DEXAMETHASONE 3.5; 10000; 1 MG/G; [IU]/G; MG/G
3.5-10000-0.1 OINTMENT OPHTHALMIC
Qty: 35 | Refills: 0 | Status: DISCONTINUED | COMMUNITY
Start: 2018-03-25

## 2018-07-17 RX ORDER — NORTRIPTYLINE HYDROCHLORIDE 25 MG/1
25 CAPSULE ORAL
Qty: 30 | Refills: 0 | Status: DISCONTINUED | COMMUNITY
Start: 2018-03-01

## 2018-07-17 RX ORDER — DULOXETINE HYDROCHLORIDE 60 MG/1
60 CAPSULE, DELAYED RELEASE PELLETS ORAL
Qty: 30 | Refills: 0 | Status: DISCONTINUED | COMMUNITY
Start: 2018-05-01

## 2018-07-17 RX ORDER — METOCLOPRAMIDE 10 MG/1
10 TABLET ORAL
Qty: 12 | Refills: 0 | Status: DISCONTINUED | COMMUNITY
Start: 2018-07-09

## 2018-07-17 RX ORDER — ONDANSETRON 4 MG/1
4 TABLET, ORALLY DISINTEGRATING ORAL
Qty: 12 | Refills: 0 | Status: DISCONTINUED | COMMUNITY
Start: 2018-07-08

## 2018-07-17 RX ORDER — AMOXICILLIN AND CLAVULANATE POTASSIUM 875; 125 MG/1; MG/1
875-125 TABLET, COATED ORAL
Qty: 14 | Refills: 0 | Status: DISCONTINUED | COMMUNITY
Start: 2018-07-09 | End: 2018-07-17

## 2018-07-17 RX ORDER — OXYCODONE AND ACETAMINOPHEN 5; 325 MG/1; MG/1
5-325 TABLET ORAL
Qty: 10 | Refills: 0 | Status: DISCONTINUED | COMMUNITY
Start: 2018-07-07

## 2018-07-17 RX ORDER — TRAMADOL HYDROCHLORIDE 50 MG/1
50 TABLET, COATED ORAL
Qty: 30 | Refills: 0 | Status: DISCONTINUED | COMMUNITY
Start: 2018-07-07

## 2018-07-17 RX ORDER — NEOMYCIN AND POLYMYXIN B SULFATES AND HYDROCORTISONE OTIC 10; 3.5; 1 MG/ML; MG/ML; [USP'U]/ML
3.5-10000-1 SUSPENSION AURICULAR (OTIC)
Qty: 10 | Refills: 0 | Status: DISCONTINUED | COMMUNITY
Start: 2018-07-08

## 2018-10-01 ENCOUNTER — APPOINTMENT (OUTPATIENT)
Dept: MATERNAL FETAL MEDICINE | Facility: CLINIC | Age: 33
End: 2018-10-01

## 2018-10-02 ENCOUNTER — LABORATORY RESULT (OUTPATIENT)
Age: 33
End: 2018-10-02

## 2018-10-04 ENCOUNTER — LABORATORY RESULT (OUTPATIENT)
Age: 33
End: 2018-10-04

## 2018-10-05 ENCOUNTER — APPOINTMENT (OUTPATIENT)
Dept: OBGYN | Facility: CLINIC | Age: 33
End: 2018-10-05
Payer: MEDICAID

## 2018-10-05 VITALS
DIASTOLIC BLOOD PRESSURE: 68 MMHG | HEIGHT: 66 IN | SYSTOLIC BLOOD PRESSURE: 116 MMHG | BODY MASS INDEX: 25.23 KG/M2 | HEART RATE: 81 BPM | WEIGHT: 157 LBS

## 2018-10-05 LAB
HCG UR QL: POSITIVE
QUALITY CONTROL: YES

## 2018-10-05 PROCEDURE — 76817 TRANSVAGINAL US OBSTETRIC: CPT

## 2018-10-05 PROCEDURE — 99213 OFFICE O/P EST LOW 20 MIN: CPT

## 2018-10-05 PROCEDURE — 81025 URINE PREGNANCY TEST: CPT

## 2018-10-12 ENCOUNTER — ASOB RESULT (OUTPATIENT)
Age: 33
End: 2018-10-12

## 2018-10-12 ENCOUNTER — APPOINTMENT (OUTPATIENT)
Dept: ANTEPARTUM | Facility: CLINIC | Age: 33
End: 2018-10-12
Payer: MEDICAID

## 2018-10-12 ENCOUNTER — APPOINTMENT (OUTPATIENT)
Dept: MATERNAL FETAL MEDICINE | Facility: CLINIC | Age: 33
End: 2018-10-12
Payer: MEDICAID

## 2018-10-12 VITALS
DIASTOLIC BLOOD PRESSURE: 56 MMHG | BODY MASS INDEX: 24.91 KG/M2 | SYSTOLIC BLOOD PRESSURE: 92 MMHG | WEIGHT: 155 LBS | HEIGHT: 66 IN

## 2018-10-12 DIAGNOSIS — Z78.9 OTHER SPECIFIED HEALTH STATUS: ICD-10-CM

## 2018-10-12 DIAGNOSIS — G62.9 POLYNEUROPATHY, UNSPECIFIED: ICD-10-CM

## 2018-10-12 PROCEDURE — 99203 OFFICE O/P NEW LOW 30 MIN: CPT

## 2018-10-12 PROCEDURE — 76817 TRANSVAGINAL US OBSTETRIC: CPT

## 2018-10-12 RX ORDER — PRENATAL VIT NO.126/IRON/FOLIC 28MG-0.8MG
28-0.8 TABLET ORAL
Refills: 0 | Status: ACTIVE | COMMUNITY

## 2018-10-12 RX ORDER — DEXTROAMPHETAMINE SULFATE, DEXTROAMPHETAMINE SACCHARATE, AMPHETAMINE SULFATE AND AMPHETAMINE ASPARTATE 7.5; 7.5; 7.5; 7.5 MG/1; MG/1; MG/1; MG/1
30 CAPSULE, EXTENDED RELEASE ORAL
Qty: 30 | Refills: 0 | Status: DISCONTINUED | COMMUNITY
Start: 2018-02-05 | End: 2018-10-12

## 2018-10-12 RX ORDER — HYDROCORTISONE AND ACETIC ACID OTIC 20.75; 10.375 MG/ML; MG/ML
1-2 SOLUTION AURICULAR (OTIC) TWICE DAILY
Qty: 1 | Refills: 2 | Status: DISCONTINUED | COMMUNITY
Start: 2018-07-10 | End: 2018-10-12

## 2018-10-12 RX ORDER — FOLIC ACID 1 MG/1
1 TABLET ORAL
Qty: 30 | Refills: 0 | Status: DISCONTINUED | COMMUNITY
Start: 2018-01-24 | End: 2018-10-12

## 2018-10-12 RX ORDER — DULOXETINE HYDROCHLORIDE 30 MG/1
30 CAPSULE, DELAYED RELEASE PELLETS ORAL
Qty: 30 | Refills: 0 | Status: DISCONTINUED | COMMUNITY
Start: 2018-03-27 | End: 2018-10-12

## 2018-10-19 ENCOUNTER — NON-APPOINTMENT (OUTPATIENT)
Age: 33
End: 2018-10-19

## 2018-10-19 ENCOUNTER — APPOINTMENT (OUTPATIENT)
Dept: OBGYN | Facility: CLINIC | Age: 33
End: 2018-10-19
Payer: MEDICAID

## 2018-10-19 VITALS
SYSTOLIC BLOOD PRESSURE: 120 MMHG | BODY MASS INDEX: 25.07 KG/M2 | DIASTOLIC BLOOD PRESSURE: 70 MMHG | HEIGHT: 66 IN | WEIGHT: 156 LBS

## 2018-10-19 PROCEDURE — 0501F PRENATAL FLOW SHEET: CPT

## 2018-10-22 LAB — HPV HIGH+LOW RISK DNA PNL CVX: DETECTED

## 2018-10-25 ENCOUNTER — LABORATORY RESULT (OUTPATIENT)
Age: 33
End: 2018-10-25

## 2018-10-29 LAB — CYTOLOGY CVX/VAG DOC THIN PREP: NORMAL

## 2018-11-05 ENCOUNTER — APPOINTMENT (OUTPATIENT)
Dept: ANTEPARTUM | Facility: CLINIC | Age: 33
End: 2018-11-05

## 2018-11-05 ENCOUNTER — APPOINTMENT (OUTPATIENT)
Dept: MATERNAL FETAL MEDICINE | Facility: CLINIC | Age: 33
End: 2018-11-05

## 2018-11-07 ENCOUNTER — APPOINTMENT (OUTPATIENT)
Dept: OBGYN | Facility: CLINIC | Age: 33
End: 2018-11-07
Payer: MEDICAID

## 2018-11-07 ENCOUNTER — APPOINTMENT (OUTPATIENT)
Dept: MATERNAL FETAL MEDICINE | Facility: CLINIC | Age: 33
End: 2018-11-07
Payer: MEDICAID

## 2018-11-07 ENCOUNTER — APPOINTMENT (OUTPATIENT)
Dept: ANTEPARTUM | Facility: CLINIC | Age: 33
End: 2018-11-07

## 2018-11-07 ENCOUNTER — ASOB RESULT (OUTPATIENT)
Age: 33
End: 2018-11-07

## 2018-11-07 VITALS
DIASTOLIC BLOOD PRESSURE: 77 MMHG | HEIGHT: 66 IN | WEIGHT: 161 LBS | SYSTOLIC BLOOD PRESSURE: 111 MMHG | BODY MASS INDEX: 25.88 KG/M2

## 2018-11-07 PROCEDURE — 57452 EXAM OF CERVIX W/SCOPE: CPT

## 2018-11-07 PROCEDURE — 0502F SUBSEQUENT PRENATAL CARE: CPT

## 2018-11-07 PROCEDURE — 99213 OFFICE O/P EST LOW 20 MIN: CPT

## 2018-11-16 ENCOUNTER — APPOINTMENT (OUTPATIENT)
Dept: ANTEPARTUM | Facility: CLINIC | Age: 33
End: 2018-11-16
Payer: MEDICAID

## 2018-11-16 ENCOUNTER — ASOB RESULT (OUTPATIENT)
Age: 33
End: 2018-11-16

## 2018-11-16 PROCEDURE — 76801 OB US < 14 WKS SINGLE FETUS: CPT

## 2018-11-16 PROCEDURE — 36416 COLLJ CAPILLARY BLOOD SPEC: CPT

## 2018-11-16 PROCEDURE — 76813 OB US NUCHAL MEAS 1 GEST: CPT

## 2018-11-21 ENCOUNTER — APPOINTMENT (OUTPATIENT)
Dept: OBGYN | Facility: CLINIC | Age: 33
End: 2018-11-21
Payer: MEDICAID

## 2018-11-21 VITALS
WEIGHT: 161 LBS | DIASTOLIC BLOOD PRESSURE: 62 MMHG | HEIGHT: 66 IN | BODY MASS INDEX: 25.88 KG/M2 | SYSTOLIC BLOOD PRESSURE: 110 MMHG

## 2018-11-21 PROCEDURE — 0502F SUBSEQUENT PRENATAL CARE: CPT

## 2018-11-26 LAB
1ST TRIMESTER DATA: NORMAL
ADDENDUM DOC: NORMAL
AFP PNL SERPL: NORMAL
AFP SERPL-ACNC: NORMAL
CLINICAL BIOCHEMIST REVIEW: ABNORMAL
FREE BETA HCG 1ST TRIMESTER: NORMAL
Lab: NORMAL
NASAL BONE: PRESENT
NOTES NTD: NORMAL
NT: NORMAL
PAPP-A SERPL-ACNC: NORMAL
TRISOMY 18/3: NORMAL

## 2018-11-28 ENCOUNTER — APPOINTMENT (OUTPATIENT)
Dept: OBGYN | Facility: CLINIC | Age: 33
End: 2018-11-28
Payer: MEDICAID

## 2018-11-28 VITALS
BODY MASS INDEX: 25.88 KG/M2 | SYSTOLIC BLOOD PRESSURE: 110 MMHG | WEIGHT: 161 LBS | DIASTOLIC BLOOD PRESSURE: 70 MMHG | HEIGHT: 66 IN

## 2018-11-28 PROCEDURE — 0502F SUBSEQUENT PRENATAL CARE: CPT

## 2018-12-10 ENCOUNTER — LABORATORY RESULT (OUTPATIENT)
Age: 33
End: 2018-12-10

## 2018-12-14 ENCOUNTER — APPOINTMENT (OUTPATIENT)
Dept: ANTEPARTUM | Facility: CLINIC | Age: 33
End: 2018-12-14
Payer: MEDICAID

## 2018-12-14 ENCOUNTER — APPOINTMENT (OUTPATIENT)
Dept: MATERNAL FETAL MEDICINE | Facility: CLINIC | Age: 33
End: 2018-12-14
Payer: MEDICAID

## 2018-12-14 ENCOUNTER — ASOB RESULT (OUTPATIENT)
Age: 33
End: 2018-12-14

## 2018-12-14 VITALS
HEIGHT: 66 IN | WEIGHT: 166.44 LBS | DIASTOLIC BLOOD PRESSURE: 60 MMHG | SYSTOLIC BLOOD PRESSURE: 96 MMHG | BODY MASS INDEX: 26.75 KG/M2

## 2018-12-14 DIAGNOSIS — G43.009 MIGRAINE W/OUT AURA, NOT INTRACTABLE, W/OUT STATUS MIGRAINOSUS: ICD-10-CM

## 2018-12-14 PROCEDURE — 76816 OB US FOLLOW-UP PER FETUS: CPT

## 2018-12-14 PROCEDURE — 99214 OFFICE O/P EST MOD 30 MIN: CPT

## 2018-12-14 PROCEDURE — 36415 COLL VENOUS BLD VENIPUNCTURE: CPT

## 2018-12-14 RX ORDER — BROMPHENIRAMINE MALEATE, PSEUDOEPHEDRINE HYDROCHLORIDE, 2; 30; 10 MG/5ML; MG/5ML; MG/5ML
30-2-10 SYRUP ORAL
Qty: 200 | Refills: 0 | Status: DISCONTINUED | COMMUNITY
Start: 2018-09-12

## 2018-12-16 ENCOUNTER — LABORATORY RESULT (OUTPATIENT)
Age: 33
End: 2018-12-16

## 2018-12-17 ENCOUNTER — APPOINTMENT (OUTPATIENT)
Dept: OBGYN | Facility: CLINIC | Age: 33
End: 2018-12-17
Payer: MEDICAID

## 2018-12-17 VITALS
DIASTOLIC BLOOD PRESSURE: 60 MMHG | HEIGHT: 66 IN | SYSTOLIC BLOOD PRESSURE: 110 MMHG | WEIGHT: 166 LBS | BODY MASS INDEX: 26.68 KG/M2

## 2018-12-17 LAB
1ST TRIMESTER DATA: NORMAL
2ND TRIMESTER DATA: NORMAL
ADDENDUM DOC: NORMAL
AFP PNL SERPL: NORMAL
AFP SERPL-ACNC: NORMAL
AFP SERPL-ACNC: NORMAL
B-HCG FREE SERPL-MCNC: NORMAL
CLINICAL BIOCHEMIST REVIEW: NORMAL
FREE BETA HCG 1ST TRIMESTER: NORMAL
INHIBIN A SERPL-MCNC: NORMAL
NASAL BONE: PRESENT
NOTES NTD: NORMAL
NT: NORMAL
PAPP-A SERPL-ACNC: NORMAL
U ESTRIOL SERPL-SCNC: NORMAL

## 2018-12-17 PROCEDURE — 0502F SUBSEQUENT PRENATAL CARE: CPT

## 2019-01-04 ENCOUNTER — APPOINTMENT (OUTPATIENT)
Dept: DERMATOLOGY | Facility: CLINIC | Age: 34
End: 2019-01-04
Payer: MEDICAID

## 2019-01-04 ENCOUNTER — RESULT REVIEW (OUTPATIENT)
Age: 34
End: 2019-01-04

## 2019-01-04 PROCEDURE — 99213 OFFICE O/P EST LOW 20 MIN: CPT | Mod: 25

## 2019-01-04 PROCEDURE — 11102 TANGNTL BX SKIN SINGLE LES: CPT

## 2019-01-07 ENCOUNTER — APPOINTMENT (OUTPATIENT)
Dept: ANTEPARTUM | Facility: CLINIC | Age: 34
End: 2019-01-07

## 2019-01-11 ENCOUNTER — APPOINTMENT (OUTPATIENT)
Dept: MATERNAL FETAL MEDICINE | Facility: CLINIC | Age: 34
End: 2019-01-11
Payer: MEDICAID

## 2019-01-11 ENCOUNTER — APPOINTMENT (OUTPATIENT)
Dept: ANTEPARTUM | Facility: CLINIC | Age: 34
End: 2019-01-11

## 2019-01-11 VITALS
WEIGHT: 172.31 LBS | RESPIRATION RATE: 16 BRPM | DIASTOLIC BLOOD PRESSURE: 60 MMHG | BODY MASS INDEX: 27.69 KG/M2 | HEIGHT: 66 IN | SYSTOLIC BLOOD PRESSURE: 92 MMHG | HEART RATE: 81 BPM | OXYGEN SATURATION: 99 %

## 2019-01-11 VITALS
DIASTOLIC BLOOD PRESSURE: 60 MMHG | WEIGHT: 172.31 LBS | OXYGEN SATURATION: 99 % | BODY MASS INDEX: 27.69 KG/M2 | HEIGHT: 66 IN | HEART RATE: 81 BPM | RESPIRATION RATE: 16 BRPM | SYSTOLIC BLOOD PRESSURE: 92 MMHG

## 2019-01-11 DIAGNOSIS — F98.8 OTHER SPECIFIED BEHAVIORAL AND EMOTIONAL DISORDERS WITH ONSET USUALLY OCCURRING IN CHILDHOOD AND ADOLESCENCE: ICD-10-CM

## 2019-01-11 PROCEDURE — 99215 OFFICE O/P EST HI 40 MIN: CPT

## 2019-01-11 RX ORDER — OMEGA-3/DHA/EPA/FISH OIL 300-1000MG
CAPSULE ORAL
Refills: 0 | Status: ACTIVE | COMMUNITY

## 2019-01-11 RX ORDER — CLOTRIMAZOLE AND BETAMETHASONE DIPROPIONATE 10; .5 MG/G; MG/G
1-0.05 CREAM TOPICAL TWICE DAILY
Qty: 1 | Refills: 3 | Status: DISCONTINUED | COMMUNITY
Start: 2018-12-17 | End: 2019-01-11

## 2019-01-11 RX ORDER — SUMATRIPTAN 25 MG/1
25 TABLET, FILM COATED ORAL
Qty: 16 | Refills: 0 | Status: ACTIVE | COMMUNITY
Start: 2018-12-14 | End: 1900-01-01

## 2019-01-11 RX ORDER — ASPIRIN 81 MG
81 TABLET, DELAYED RELEASE (ENTERIC COATED) ORAL
Refills: 0 | Status: ACTIVE | COMMUNITY

## 2019-01-11 NOTE — FAMILY HISTORY
[Age 35+ During Pregnancy] : not 35 or over during pregnancy [Reported Family History Of Birth Defects] : no congenital heart defects [Macho-Sachs Carrier] : no Macho-Sachs [Family History] : no mental retardation/autism [Reported Family History Of Genetic Disease] : no history of child defect in child of baby father

## 2019-01-11 NOTE — DISCUSSION/SUMMARY
[FreeTextEntry1] : She is 20 weeks and 5 days by her last menstrual period dates.\par \par She has ADD and discontinue taking Adderall after she found out she was pregnant. She is currently having symptoms from the ADD and now wants to take the Adderall medication.  \par \par I told her that ADD is a neurobehavioral condition found in 2 -5 % of adults. An [place] study has reported that stimulant (methylphenidate and amphetamines) treatment for ADHD before and during pregnancy has been associated with an increased risk for  delivery, preeclampsia,  birth, and 1 minute Apgar scores < 7 ([employer] 2018;32 (4): 377- 386). There is concern that discontinuation of ADHD medications may cause a deterioration of the mental health of the mother with exacerbation of disorganized behavior, emotional instability, stress, and poor risk management which can also be associated with adverse pregnancy outcomes. I briefly discussed the possible effects of the ADHD medications during pregnancy and lactation. She told me that her condition has deteriorated since she discontinued the Adderall medication. She is currently not able to perform her daily activities well. She is not focusing and is having stress. She wants to start taking the Adderall medication to treat her ADD symptoms. I told her that she should take the lowest dosage that treats her ADD symptoms. She can start with half the dose she was taking prior to pregnancy. She was being treated by a psychiatric nurse practitioner (Maureen Moore) before pregnancy. She told me that the nurse practitioner does not want to see her during the pregnancy. I told her that I will give her a prescription for Adderall XR 15 mg daily. I will also give her a referral for a behavioral medicine specialist to monitor her condition during the pregnancy.\par \par I again gave her information regarding Adderall use and pregnancy. I told her that Adderall is an amphetamine. It is a pregnancy category C medication because there are no adequate and well controlled studies of amphetamines in pregnant women.  The current data is insufficient to determine the risk for major birth defects or miscarriage with use during pregnancy. This medication should be used during pregnancy if the potential benefit justifies the potential risk to the fetus. Amphetamines may cause vasoconstriction, resulting in decreased placental perfusion and stimulation of uterine contractions, increasing the chance for a  delivery. I told her that when women take amphetamines during pregnancy there is an increased risk for having  births and low birth weight infants.  If the medication is taken in the late third trimester the  infants may experience symptoms of withdrawal (agitation, feeding difficulties, irritability, drowsiness and lassitude). I also told her that Adderall is not recommended to be used during breastfeeding.  \par \par \par \par \par \par \par \par \par

## 2019-01-11 NOTE — SURGICAL HISTORY
[Last Pap: ___] : Last Pap: [unfilled] [Fibroids] : no fibroids [Abn Paps] : no abnormal pap smears [Breast Disease] : no breast disease [STI's] : no STI's [Infertility] : no infertility [Cysts] : no cysts [OC Use] : no OC use [Last Mammo: ___] : Last Mammo: none

## 2019-01-11 NOTE — OB HISTORY
[Pregnancy History] : girl [LMP: ___] : LMP: [unfilled] [MARY LOU: ___] : MARY LOU: [unfilled] [EGA: ___ wks] : EGA: [unfilled] wks [Spontaneous] : Spontaneous conception [Sonogram] : sonogram [at ___ wks] : at [unfilled] weeks [Definite:  ___ (Date)] : the last menstrual period was [unfilled] [Normal Amount/Duration] : was of a normal amount and duration [Regular Cycle Intervals] : periods have been regular [Frequency: Q ___ days] : menstrual periods occur approximately every [unfilled] days [Menarche Age: ____] : age at menarche was [unfilled] [___] : no pregnancy complications reported [FreeTextEntry1] : She was seen by me on October 12, 2018 for maternal fetal medicine consultation to discuss traveling recommendations to contrast with Zika virus transmission. I also discussed medication exposure during the first trimester of pregnancy. She was taking Adderall and Cymbalta. \par \par She had genetic counseling on November 7, 2018 to discuss prenatal testing options for birth defects. She had a discussion with our genetic counselor on November 27, 2018 regarding her prenatal screen testing results.  A first trimester screening test done on November 16, 2018 reported an increased risk for Down syndrome and a low risk for trisomy 18/13. The maternal FARHAN A level was noted to be low at the 5th percentile. She also had noninvasive prenatal screen testing that was negative for fetal chromosomal abnormalities.\par \par  [Spotting Between  Menses] : no spotting between menses [Menstrual Cramps] : no menstrual cramps [On BCP at conception] : the patient was not on BCP at conception

## 2019-01-11 NOTE — HISTORY OF PRESENT ILLNESS
[FreeTextEntry1] : She told me that she was diagnosed with attention deficit disorder approximately 10 years ago.The attention deficit disorder is being treated by a psychiatric nurse practitioner. She has been taking Adderall 30 mg daily. 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 The neuropathy is being treated by a neurologist at the [facility]. she was taken Cymbalta to treat the neuropathy. She discontinued the medication after she found out she was pregnant.\par \par She told me that she suffers from migraine headaches since age 23. She usually takes Excedrin Migraine for relief of migraine symptoms.

## 2019-01-14 ENCOUNTER — APPOINTMENT (OUTPATIENT)
Dept: OBGYN | Facility: CLINIC | Age: 34
End: 2019-01-14
Payer: MEDICAID

## 2019-01-14 VITALS
WEIGHT: 171 LBS | HEIGHT: 66 IN | SYSTOLIC BLOOD PRESSURE: 113 MMHG | BODY MASS INDEX: 27.48 KG/M2 | DIASTOLIC BLOOD PRESSURE: 67 MMHG

## 2019-01-14 PROCEDURE — 0502F SUBSEQUENT PRENATAL CARE: CPT

## 2019-01-16 ENCOUNTER — APPOINTMENT (OUTPATIENT)
Dept: ANTEPARTUM | Facility: CLINIC | Age: 34
End: 2019-01-16
Payer: MEDICAID

## 2019-01-16 ENCOUNTER — ASOB RESULT (OUTPATIENT)
Age: 34
End: 2019-01-16

## 2019-01-16 PROCEDURE — 76811 OB US DETAILED SNGL FETUS: CPT

## 2019-01-16 PROCEDURE — 76817 TRANSVAGINAL US OBSTETRIC: CPT

## 2019-02-11 ENCOUNTER — APPOINTMENT (OUTPATIENT)
Dept: OBGYN | Facility: CLINIC | Age: 34
End: 2019-02-11
Payer: MEDICAID

## 2019-02-11 VITALS
SYSTOLIC BLOOD PRESSURE: 104 MMHG | BODY MASS INDEX: 29.09 KG/M2 | HEIGHT: 66 IN | WEIGHT: 181 LBS | DIASTOLIC BLOOD PRESSURE: 64 MMHG

## 2019-02-11 PROCEDURE — 0502F SUBSEQUENT PRENATAL CARE: CPT

## 2019-02-13 ENCOUNTER — ASOB RESULT (OUTPATIENT)
Age: 34
End: 2019-02-13

## 2019-02-13 ENCOUNTER — APPOINTMENT (OUTPATIENT)
Dept: ANTEPARTUM | Facility: CLINIC | Age: 34
End: 2019-02-13
Payer: MEDICAID

## 2019-02-13 PROCEDURE — 76816 OB US FOLLOW-UP PER FETUS: CPT

## 2019-03-01 ENCOUNTER — LABORATORY RESULT (OUTPATIENT)
Age: 34
End: 2019-03-01

## 2019-03-06 ENCOUNTER — NON-APPOINTMENT (OUTPATIENT)
Age: 34
End: 2019-03-06

## 2019-03-11 ENCOUNTER — APPOINTMENT (OUTPATIENT)
Dept: OBGYN | Facility: CLINIC | Age: 34
End: 2019-03-11
Payer: MEDICAID

## 2019-03-11 ENCOUNTER — NON-APPOINTMENT (OUTPATIENT)
Age: 34
End: 2019-03-11

## 2019-03-11 VITALS
DIASTOLIC BLOOD PRESSURE: 60 MMHG | HEIGHT: 66 IN | WEIGHT: 182 LBS | SYSTOLIC BLOOD PRESSURE: 120 MMHG | BODY MASS INDEX: 29.25 KG/M2

## 2019-03-11 PROCEDURE — 0502F SUBSEQUENT PRENATAL CARE: CPT

## 2019-03-13 ENCOUNTER — ASOB RESULT (OUTPATIENT)
Age: 34
End: 2019-03-13

## 2019-03-13 ENCOUNTER — APPOINTMENT (OUTPATIENT)
Dept: ANTEPARTUM | Facility: CLINIC | Age: 34
End: 2019-03-13
Payer: MEDICAID

## 2019-03-13 PROCEDURE — 76816 OB US FOLLOW-UP PER FETUS: CPT

## 2019-03-13 PROCEDURE — 76819 FETAL BIOPHYS PROFIL W/O NST: CPT

## 2019-03-13 PROCEDURE — 76820 UMBILICAL ARTERY ECHO: CPT

## 2019-03-14 ENCOUNTER — RX RENEWAL (OUTPATIENT)
Age: 34
End: 2019-03-14

## 2019-03-25 ENCOUNTER — NON-APPOINTMENT (OUTPATIENT)
Age: 34
End: 2019-03-25

## 2019-03-25 ENCOUNTER — APPOINTMENT (OUTPATIENT)
Dept: OBGYN | Facility: CLINIC | Age: 34
End: 2019-03-25
Payer: MEDICAID

## 2019-03-25 VITALS
HEIGHT: 66 IN | BODY MASS INDEX: 29.41 KG/M2 | SYSTOLIC BLOOD PRESSURE: 120 MMHG | DIASTOLIC BLOOD PRESSURE: 60 MMHG | WEIGHT: 183 LBS

## 2019-03-25 PROCEDURE — 0502F SUBSEQUENT PRENATAL CARE: CPT

## 2019-04-03 ENCOUNTER — APPOINTMENT (OUTPATIENT)
Age: 34
End: 2019-04-03
Payer: MEDICAID

## 2019-04-03 ENCOUNTER — ASOB RESULT (OUTPATIENT)
Age: 34
End: 2019-04-03

## 2019-04-03 ENCOUNTER — APPOINTMENT (OUTPATIENT)
Dept: OBGYN | Facility: CLINIC | Age: 34
End: 2019-04-03
Payer: MEDICAID

## 2019-04-03 VITALS
HEIGHT: 66 IN | DIASTOLIC BLOOD PRESSURE: 70 MMHG | SYSTOLIC BLOOD PRESSURE: 116 MMHG | BODY MASS INDEX: 29.89 KG/M2 | WEIGHT: 186 LBS

## 2019-04-03 PROCEDURE — 0502F SUBSEQUENT PRENATAL CARE: CPT

## 2019-04-03 PROCEDURE — 76815 OB US LIMITED FETUS(S): CPT

## 2019-04-03 PROCEDURE — 59025 FETAL NON-STRESS TEST: CPT

## 2019-04-03 PROCEDURE — 76819 FETAL BIOPHYS PROFIL W/O NST: CPT

## 2019-04-10 ENCOUNTER — APPOINTMENT (OUTPATIENT)
Dept: ANTEPARTUM | Facility: CLINIC | Age: 34
End: 2019-04-10
Payer: MEDICAID

## 2019-04-10 ENCOUNTER — ASOB RESULT (OUTPATIENT)
Age: 34
End: 2019-04-10

## 2019-04-10 ENCOUNTER — RX RENEWAL (OUTPATIENT)
Age: 34
End: 2019-04-10

## 2019-04-10 ENCOUNTER — APPOINTMENT (OUTPATIENT)
Dept: OBGYN | Facility: CLINIC | Age: 34
End: 2019-04-10
Payer: MEDICAID

## 2019-04-10 VITALS
BODY MASS INDEX: 30.22 KG/M2 | WEIGHT: 188 LBS | HEIGHT: 66 IN | SYSTOLIC BLOOD PRESSURE: 122 MMHG | DIASTOLIC BLOOD PRESSURE: 82 MMHG

## 2019-04-10 PROCEDURE — 76821 MIDDLE CEREBRAL ARTERY ECHO: CPT

## 2019-04-10 PROCEDURE — 59025 FETAL NON-STRESS TEST: CPT

## 2019-04-10 PROCEDURE — 76819 FETAL BIOPHYS PROFIL W/O NST: CPT

## 2019-04-10 PROCEDURE — 76816 OB US FOLLOW-UP PER FETUS: CPT

## 2019-04-10 PROCEDURE — 0502F SUBSEQUENT PRENATAL CARE: CPT

## 2019-04-10 PROCEDURE — 93976 VASCULAR STUDY: CPT

## 2019-04-10 PROCEDURE — 76820 UMBILICAL ARTERY ECHO: CPT

## 2019-04-10 RX ORDER — FOLIC ACID 1 MG/1
1 TABLET ORAL DAILY
Qty: 30 | Refills: 2 | Status: ACTIVE | COMMUNITY
Start: 2018-12-14 | End: 1900-01-01

## 2019-04-17 ENCOUNTER — ASOB RESULT (OUTPATIENT)
Age: 34
End: 2019-04-17

## 2019-04-17 ENCOUNTER — APPOINTMENT (OUTPATIENT)
Dept: ANTEPARTUM | Facility: CLINIC | Age: 34
End: 2019-04-17
Payer: MEDICAID

## 2019-04-17 ENCOUNTER — APPOINTMENT (OUTPATIENT)
Dept: ANTEPARTUM | Facility: CLINIC | Age: 34
End: 2019-04-17

## 2019-04-17 ENCOUNTER — APPOINTMENT (OUTPATIENT)
Dept: OBGYN | Facility: CLINIC | Age: 34
End: 2019-04-17
Payer: MEDICAID

## 2019-04-17 VITALS
WEIGHT: 186 LBS | DIASTOLIC BLOOD PRESSURE: 65 MMHG | SYSTOLIC BLOOD PRESSURE: 110 MMHG | BODY MASS INDEX: 29.89 KG/M2 | HEIGHT: 66 IN

## 2019-04-17 PROCEDURE — 0502F SUBSEQUENT PRENATAL CARE: CPT

## 2019-04-17 PROCEDURE — 59025 FETAL NON-STRESS TEST: CPT

## 2019-04-17 PROCEDURE — 76819 FETAL BIOPHYS PROFIL W/O NST: CPT

## 2019-04-24 ENCOUNTER — LABORATORY RESULT (OUTPATIENT)
Age: 34
End: 2019-04-24

## 2019-04-24 ENCOUNTER — APPOINTMENT (OUTPATIENT)
Age: 34
End: 2019-04-24
Payer: MEDICAID

## 2019-04-24 ENCOUNTER — ASOB RESULT (OUTPATIENT)
Age: 34
End: 2019-04-24

## 2019-04-24 ENCOUNTER — APPOINTMENT (OUTPATIENT)
Dept: OBGYN | Facility: CLINIC | Age: 34
End: 2019-04-24
Payer: MEDICAID

## 2019-04-24 PROCEDURE — 76819 FETAL BIOPHYS PROFIL W/O NST: CPT

## 2019-04-24 PROCEDURE — 93976 VASCULAR STUDY: CPT

## 2019-04-24 PROCEDURE — 76820 UMBILICAL ARTERY ECHO: CPT

## 2019-04-24 PROCEDURE — 0502F SUBSEQUENT PRENATAL CARE: CPT

## 2019-04-24 PROCEDURE — 76821 MIDDLE CEREBRAL ARTERY ECHO: CPT

## 2019-04-24 PROCEDURE — 59025 FETAL NON-STRESS TEST: CPT

## 2019-04-29 ENCOUNTER — LABORATORY RESULT (OUTPATIENT)
Age: 34
End: 2019-04-29

## 2019-05-01 ENCOUNTER — ASOB RESULT (OUTPATIENT)
Age: 34
End: 2019-05-01

## 2019-05-01 ENCOUNTER — APPOINTMENT (OUTPATIENT)
Dept: OBGYN | Facility: CLINIC | Age: 34
End: 2019-05-01
Payer: MEDICAID

## 2019-05-01 ENCOUNTER — APPOINTMENT (OUTPATIENT)
Age: 34
End: 2019-05-01
Payer: MEDICAID

## 2019-05-01 VITALS
WEIGHT: 189 LBS | HEIGHT: 66 IN | BODY MASS INDEX: 30.37 KG/M2 | DIASTOLIC BLOOD PRESSURE: 70 MMHG | SYSTOLIC BLOOD PRESSURE: 110 MMHG

## 2019-05-01 PROCEDURE — 76819 FETAL BIOPHYS PROFIL W/O NST: CPT

## 2019-05-01 PROCEDURE — 93976 VASCULAR STUDY: CPT

## 2019-05-01 PROCEDURE — 76821 MIDDLE CEREBRAL ARTERY ECHO: CPT

## 2019-05-01 PROCEDURE — 76820 UMBILICAL ARTERY ECHO: CPT

## 2019-05-01 PROCEDURE — 0502F SUBSEQUENT PRENATAL CARE: CPT

## 2019-05-01 PROCEDURE — 59025 FETAL NON-STRESS TEST: CPT

## 2019-05-08 ENCOUNTER — ASOB RESULT (OUTPATIENT)
Age: 34
End: 2019-05-08

## 2019-05-08 ENCOUNTER — APPOINTMENT (OUTPATIENT)
Age: 34
End: 2019-05-08
Payer: MEDICAID

## 2019-05-08 ENCOUNTER — APPOINTMENT (OUTPATIENT)
Dept: OBGYN | Facility: CLINIC | Age: 34
End: 2019-05-08
Payer: MEDICAID

## 2019-05-08 PROCEDURE — 76816 OB US FOLLOW-UP PER FETUS: CPT

## 2019-05-08 PROCEDURE — 76819 FETAL BIOPHYS PROFIL W/O NST: CPT

## 2019-05-08 PROCEDURE — 76820 UMBILICAL ARTERY ECHO: CPT

## 2019-05-08 PROCEDURE — 59025 FETAL NON-STRESS TEST: CPT

## 2019-05-08 PROCEDURE — 76821 MIDDLE CEREBRAL ARTERY ECHO: CPT

## 2019-05-08 PROCEDURE — 0502F SUBSEQUENT PRENATAL CARE: CPT

## 2019-05-08 PROCEDURE — 93976 VASCULAR STUDY: CPT

## 2019-05-10 ENCOUNTER — OUTPATIENT (OUTPATIENT)
Dept: OUTPATIENT SERVICES | Facility: HOSPITAL | Age: 34
LOS: 1 days | End: 2019-05-10
Payer: MEDICAID

## 2019-05-10 VITALS — WEIGHT: 149.91 LBS | HEIGHT: 67 IN

## 2019-05-10 DIAGNOSIS — Z01.818 ENCOUNTER FOR OTHER PREPROCEDURAL EXAMINATION: ICD-10-CM

## 2019-05-10 DIAGNOSIS — Z98.89 OTHER SPECIFIED POSTPROCEDURAL STATES: Chronic | ICD-10-CM

## 2019-05-10 DIAGNOSIS — O34.219 MATERNAL CARE FOR UNSPECIFIED TYPE SCAR FROM PREVIOUS CESAREAN DELIVERY: ICD-10-CM

## 2019-05-10 LAB
ANION GAP SERPL CALC-SCNC: 11 MMOL/L — SIGNIFICANT CHANGE UP (ref 5–17)
APPEARANCE UR: CLEAR — SIGNIFICANT CHANGE UP
BASOPHILS # BLD AUTO: 0 K/UL — SIGNIFICANT CHANGE UP (ref 0–0.2)
BASOPHILS NFR BLD AUTO: 0.2 % — SIGNIFICANT CHANGE UP (ref 0–2)
BILIRUB UR-MCNC: NEGATIVE — SIGNIFICANT CHANGE UP
BLD GP AB SCN SERPL QL: SIGNIFICANT CHANGE UP
BUN SERPL-MCNC: 6 MG/DL — LOW (ref 8–20)
CALCIUM SERPL-MCNC: 9.4 MG/DL — SIGNIFICANT CHANGE UP (ref 8.6–10.2)
CHLORIDE SERPL-SCNC: 104 MMOL/L — SIGNIFICANT CHANGE UP (ref 98–107)
CO2 SERPL-SCNC: 23 MMOL/L — SIGNIFICANT CHANGE UP (ref 22–29)
COLOR SPEC: SIGNIFICANT CHANGE UP
CREAT SERPL-MCNC: 0.5 MG/DL — SIGNIFICANT CHANGE UP (ref 0.5–1.3)
DIFF PNL FLD: NEGATIVE — SIGNIFICANT CHANGE UP
EOSINOPHIL # BLD AUTO: 0.1 K/UL — SIGNIFICANT CHANGE UP (ref 0–0.5)
EOSINOPHIL NFR BLD AUTO: 1.1 % — SIGNIFICANT CHANGE UP (ref 0–6)
GLUCOSE SERPL-MCNC: 74 MG/DL — SIGNIFICANT CHANGE UP (ref 70–115)
GLUCOSE UR QL: NEGATIVE MG/DL — SIGNIFICANT CHANGE UP
HCT VFR BLD CALC: 34.4 % — LOW (ref 37–47)
HGB BLD-MCNC: 11.5 G/DL — LOW (ref 12–16)
KETONES UR-MCNC: NEGATIVE — SIGNIFICANT CHANGE UP
LEUKOCYTE ESTERASE UR-ACNC: NEGATIVE — SIGNIFICANT CHANGE UP
LYMPHOCYTES # BLD AUTO: 1.3 K/UL — SIGNIFICANT CHANGE UP (ref 1–4.8)
LYMPHOCYTES # BLD AUTO: 21.3 % — SIGNIFICANT CHANGE UP (ref 20–55)
MCHC RBC-ENTMCNC: 33.4 G/DL — SIGNIFICANT CHANGE UP (ref 32–36)
MCHC RBC-ENTMCNC: 33.5 PG — HIGH (ref 27–31)
MCV RBC AUTO: 100.3 FL — HIGH (ref 81–99)
MONOCYTES # BLD AUTO: 0.4 K/UL — SIGNIFICANT CHANGE UP (ref 0–0.8)
MONOCYTES NFR BLD AUTO: 7.3 % — SIGNIFICANT CHANGE UP (ref 3–10)
NEUTROPHILS # BLD AUTO: 4.2 K/UL — SIGNIFICANT CHANGE UP (ref 1.8–8)
NEUTROPHILS NFR BLD AUTO: 69.3 % — SIGNIFICANT CHANGE UP (ref 37–73)
NITRITE UR-MCNC: NEGATIVE — SIGNIFICANT CHANGE UP
PH UR: 8 — SIGNIFICANT CHANGE UP (ref 5–8)
PLATELET # BLD AUTO: 102 K/UL — LOW (ref 150–400)
POTASSIUM SERPL-MCNC: 3.6 MMOL/L — SIGNIFICANT CHANGE UP (ref 3.5–5.3)
POTASSIUM SERPL-SCNC: 3.6 MMOL/L — SIGNIFICANT CHANGE UP (ref 3.5–5.3)
PROT UR-MCNC: NEGATIVE MG/DL — SIGNIFICANT CHANGE UP
RBC # BLD: 3.43 M/UL — LOW (ref 4.4–5.2)
RBC # FLD: 13.4 % — SIGNIFICANT CHANGE UP (ref 11–15.6)
SODIUM SERPL-SCNC: 138 MMOL/L — SIGNIFICANT CHANGE UP (ref 135–145)
SP GR SPEC: 1.01 — SIGNIFICANT CHANGE UP (ref 1.01–1.02)
TYPE + AB SCN PNL BLD: SIGNIFICANT CHANGE UP
UROBILINOGEN FLD QL: NEGATIVE MG/DL — SIGNIFICANT CHANGE UP
WBC # BLD: 6.1 K/UL — SIGNIFICANT CHANGE UP (ref 4.8–10.8)
WBC # FLD AUTO: 6.1 K/UL — SIGNIFICANT CHANGE UP (ref 4.8–10.8)

## 2019-05-10 PROCEDURE — G0463: CPT

## 2019-05-10 PROCEDURE — 86901 BLOOD TYPING SEROLOGIC RH(D): CPT

## 2019-05-10 PROCEDURE — 81003 URINALYSIS AUTO W/O SCOPE: CPT

## 2019-05-10 PROCEDURE — 80048 BASIC METABOLIC PNL TOTAL CA: CPT

## 2019-05-10 PROCEDURE — 86850 RBC ANTIBODY SCREEN: CPT

## 2019-05-10 PROCEDURE — 36415 COLL VENOUS BLD VENIPUNCTURE: CPT

## 2019-05-10 PROCEDURE — 86900 BLOOD TYPING SEROLOGIC ABO: CPT

## 2019-05-10 PROCEDURE — 85027 COMPLETE CBC AUTOMATED: CPT

## 2019-05-10 NOTE — OB PST NOTE - PMH
ADD (attention deficit disorder)    Kidney stone    Nerve damage  left side faciaql pain permanent after taking cipro

## 2019-05-11 ENCOUNTER — OUTPATIENT (OUTPATIENT)
Dept: INPATIENT UNIT | Facility: HOSPITAL | Age: 34
LOS: 1 days | End: 2019-05-11
Payer: MEDICAID

## 2019-05-11 VITALS — DIASTOLIC BLOOD PRESSURE: 68 MMHG | HEART RATE: 103 BPM | SYSTOLIC BLOOD PRESSURE: 117 MMHG

## 2019-05-11 VITALS
RESPIRATION RATE: 16 BRPM | TEMPERATURE: 98 F | HEART RATE: 105 BPM | DIASTOLIC BLOOD PRESSURE: 67 MMHG | SYSTOLIC BLOOD PRESSURE: 102 MMHG

## 2019-05-11 DIAGNOSIS — O47.1 FALSE LABOR AT OR AFTER 37 COMPLETED WEEKS OF GESTATION: ICD-10-CM

## 2019-05-11 DIAGNOSIS — Z98.89 OTHER SPECIFIED POSTPROCEDURAL STATES: Chronic | ICD-10-CM

## 2019-05-11 PROCEDURE — 59025 FETAL NON-STRESS TEST: CPT

## 2019-05-11 PROCEDURE — G0463: CPT

## 2019-05-11 RX ORDER — ACETAMINOPHEN 500 MG
650 TABLET ORAL ONCE
Refills: 0 | Status: COMPLETED | OUTPATIENT
Start: 2019-05-11 | End: 2019-05-11

## 2019-05-11 RX ORDER — SODIUM CHLORIDE 9 MG/ML
1000 INJECTION, SOLUTION INTRAVENOUS ONCE
Refills: 0 | Status: COMPLETED | OUTPATIENT
Start: 2019-05-11 | End: 2019-05-11

## 2019-05-11 RX ADMIN — SODIUM CHLORIDE 2000 MILLILITER(S): 9 INJECTION, SOLUTION INTRAVENOUS at 19:50

## 2019-05-11 RX ADMIN — Medication 650 MILLIGRAM(S): at 20:03

## 2019-05-11 NOTE — OB PROVIDER TRIAGE NOTE - HISTORY OF PRESENT ILLNESS
34 y/o  at 37w6d presenting with 1 day history of lower abdominal and back pain, headache, dizziness, and nausea. Denies vaginal bleeding, leakage of fluid, and feels like she is not having true contractions. +FM  Prenatal course significant for low FARHAN-A     ObGynHx: C/S x1 (, full term, failed IOL)   PMH/PSH: ADHD   Meds: PNV, ASA, Adderall   Allergies: fluoroquinolones

## 2019-05-11 NOTE — OB PROVIDER TRIAGE NOTE - NSOBPROVIDERNOTE_OBGYN_ALL_OB_FT
32 y/o  at 37w6d being evaluated for labor.   - tylenol   - IV fluid hydration      Will re-evaluate.     d/w Dr. Wolf 34 y/o  at 37w6d being evaluated for labor.   - Tylenol  - IV fluid hydration      Patient's pain resolved and contractions stopped after supportive management.   Patient will be discharged home and was given labor precautions. She verbalized understanding.     d/w Dr. Wolf

## 2019-05-11 NOTE — OB PROVIDER TRIAGE NOTE - NSHPPHYSICALEXAM_GEN_ALL_CORE
Vital Signs Last 24 Hrs  T(C): 36.7 (11 May 2019 18:26), Max: 36.7 (11 May 2019 18:26)  T(F): 98.1 (11 May 2019 18:26), Max: 98.1 (11 May 2019 18:26)  HR: 105 (11 May 2019 18:30) (105 - 105)  BP: 102/67 (11 May 2019 18:30) (102/67 - 102/67)  BP(mean): --  RR: 16 (11 May 2019 18:26) (16 - 16)  SpO2: --    FHT: baseline 130, moderate variability, +accels, no decels   [place]: nishant q2-6m, irregular   SVE: 0/0/-3

## 2019-05-11 NOTE — OB PROVIDER TRIAGE NOTE - NS_FETALHEARTRATE_OBGYN_ALL_OB_FT
HOSPITALIST PROGRESS NOTE:    Date of Admit: 7/23/2018 12:27 PM  Date of Service: 7/24/2018.  Hospital day #: 1 days.   Code status: Full Resuscitation.    Summary:Duane C Luick is admitted with a complaint of  lightheadedness, decreased appetite, diarrhea, hypotension. Hypotension likely due to dehydration/intravascular volume loss. Did receive IV fluids.    Chief complaint:   Chief Complaint   Patient presents with   • Hypotension        Subjective: Blood pressure has improved but he is still orthostatic. Does have dizziness when he stands up. Diarrhea is more frequent today. No abdominal pain.      ROS: I performed comprehensive system review which is negative except for the positives stated in the narrative above.     Reviewed Pertinent: Allergies, Medications, Medical History, Surgical History, Social History, Family History, Radiology, Labs and Records.      Intake/Output Summary (Last 24 hours) at 07/24/18 0947  Last data filed at 07/24/18 0930   Gross per 24 hour   Intake             1230 ml   Output             1050 ml   Net              180 ml        Rhythm: Ventricular pacing    PHYSICAL EXAMINATION:   Blood pressure (!) 83/54, pulse 71, temperature 97.9 °F (36.6 °C), temperature source Oral, resp. rate 22, height 5' 9\" (1.753 m), weight 95.6 kg, SpO2 96 %.   GENERAL:  Alert, awake, not in distress or pain.   HEENT: PERRLA, pink conjunctivae,anicteric sclerae.   NECK: Supple. No JVD   LUNGS: normal respiratory effort. Clear to auscultation bilaterally.     HEART: Normal rate and regular rhythm. S1, S2 well heard. No murmurs, no rubs or gallops.    ABDOMEN:  Non -distended, soft and nontender. No guarding, no rigidity.   MUSCULOSKELETAL:  Chronic swelling over right lower extremity   NEUROLOGIC: oriented to TPP, CN are grossly intact. Motor and sensation is grossly unremarkable.  SKIN:  No rashes, bruises, hematomas.   PSYCHIATRY: normal affect, mood. Normal speech.    LAB, IMAGING STUDIES, MICROBIOLOGY  STUDIES and Medications are reviewed in EHR    Creatinine (mg/dL)   Date Value   07/24/2018 1.07   07/23/2018 1.29 (H)   07/02/2018 1.26 (H)       HGB (g/dL)   Date Value   07/24/2018 14.4   07/23/2018 15.1   04/13/2018 13.9         ASSESSMENT/PLAN:    1. Hypotension/orthostatic hypotension-due to intravascular depletion in the setting of diarrhea, decreased oral intake and continued use of blood pressure medications. He has received IV fluids. Supine, sitting blood pressure is now normal. Blood pressure drops when he stands up. He is symptomatic. Did receive a dose of midodrine overnight. We'll give 500 cc of normal saline bolus now. Continue to hold diuretics and other blood pressure medications.   2. Diarrhea-causes unclear. Enteric pathogen/Ova/parasite pending. Diarrhea is worse today. We will check for C. difficile with intent to treat if positive.  3. Mild troponin elevation. No chest pain. Likely demand. Echo today.  4. Atrial fibrillation. CHB s/p PPM. Continue Eliquis  5. Metastatic pheochromocytoma. Receiving radiation to lower back due to pain. Patient to continue radiation while inpatient.  6. Acute kidney injury on CKD 3-creatinine is now improved       DVT PROPHYLAXIS:    Yes, Sequential compression device(s)  No pharmacologic Venous Thromboembolism prophylaxis due to Currently therapeutic on antithrombotics or thrombolytics or anticoagulants (e.g. dabigatran, heparin, enoxaparin, fondaparinux, argatroban, warfarin, rivaroxaban, apixaban)     NUTRITION  Oral diet     DISCHARGE PLANNING:  JAMES: 7/25/18  POSSIBLE BARRIERS TO DISCHARGE: Clinical condition-improvement of symptoms and blood pressure  TENTATIVE DISCHARGE DISPOSITION:  Home  -------------------------------------------------------------------------------------------------------------------  Case discussed with:  Patient,  Family, RN and Consultants    Tc Trevizo MD    7/24/2018 9:47 AM  Pager:  [phone]   Page directly from  7am to 7PM. Night Shift 7:00 p.m. - 7:00 a.m. Use On Call Pager [phone]    130

## 2019-05-11 NOTE — OB PROVIDER TRIAGE NOTE - NS_EDDCALCULATED_OBGYN_ALL_OB
Patient scheduled for surgery.  Date placed on OB/GYN calendar.    Procedure: Repeat   41w0d  Estimated Date of Delivery: May 2, 2018      OR time needed 1 hr  Diagnosis: previous   Doctor: Mahesh  Assist: no  Insurance:   Special Equipment: no  Bowel prep: no  Date: 18  Time: 0700  Anesthesia: spinal  Pre-op: done by Dr Aragon on 18  Same day or admit post op: adming  Hospital: [facility]  Surgery Scheduler: Meño    Patient informed of the following instructions:   NPO after midnight the night before surgery.   Pre-op physical to be done within 30 days of surgery.   Avoid Aspirin and Ibuprofen 7-10 days prior to surgery.   Arrive at Same Day Surgery Department 1 1/2 hours prior to time of surgery.   Advised to contact insurance company regarding surgery pre-authorization.  Patient should arrange to have someone drive her home after surgery.  Surgery booklet and showering info given to patient.  Gaviota Francis RN              
First Trimester Sonogram

## 2019-05-15 ENCOUNTER — APPOINTMENT (OUTPATIENT)
Dept: OBGYN | Facility: CLINIC | Age: 34
End: 2019-05-15
Payer: MEDICAID

## 2019-05-15 ENCOUNTER — ASOB RESULT (OUTPATIENT)
Age: 34
End: 2019-05-15

## 2019-05-15 ENCOUNTER — APPOINTMENT (OUTPATIENT)
Dept: ANTEPARTUM | Facility: CLINIC | Age: 34
End: 2019-05-15
Payer: MEDICAID

## 2019-05-15 VITALS
HEIGHT: 66 IN | WEIGHT: 192 LBS | DIASTOLIC BLOOD PRESSURE: 60 MMHG | BODY MASS INDEX: 30.86 KG/M2 | SYSTOLIC BLOOD PRESSURE: 120 MMHG

## 2019-05-15 PROCEDURE — 93976 VASCULAR STUDY: CPT

## 2019-05-15 PROCEDURE — 0502F SUBSEQUENT PRENATAL CARE: CPT

## 2019-05-15 PROCEDURE — 76819 FETAL BIOPHYS PROFIL W/O NST: CPT

## 2019-05-15 PROCEDURE — 76820 UMBILICAL ARTERY ECHO: CPT

## 2019-05-15 PROCEDURE — 59025 FETAL NON-STRESS TEST: CPT

## 2019-05-15 PROCEDURE — 76821 MIDDLE CEREBRAL ARTERY ECHO: CPT

## 2019-05-16 ENCOUNTER — INPATIENT (INPATIENT)
Facility: HOSPITAL | Age: 34
LOS: 2 days | Discharge: HOME | End: 2019-05-19
Payer: MEDICAID

## 2019-05-16 VITALS — SYSTOLIC BLOOD PRESSURE: 116 MMHG | DIASTOLIC BLOOD PRESSURE: 70 MMHG | HEART RATE: 104 BPM

## 2019-05-16 DIAGNOSIS — Z98.89 OTHER SPECIFIED POSTPROCEDURAL STATES: Chronic | ICD-10-CM

## 2019-05-16 DIAGNOSIS — O34.219 MATERNAL CARE FOR UNSPECIFIED TYPE SCAR FROM PREVIOUS CESAREAN DELIVERY: ICD-10-CM

## 2019-05-16 DIAGNOSIS — O47.1 FALSE LABOR AT OR AFTER 37 COMPLETED WEEKS OF GESTATION: ICD-10-CM

## 2019-05-16 PROBLEM — T14.8XXA OTHER INJURY OF UNSPECIFIED BODY REGION, INITIAL ENCOUNTER: Chronic | Status: ACTIVE | Noted: 2019-05-10

## 2019-05-16 LAB
APPEARANCE UR: CLEAR — SIGNIFICANT CHANGE UP
BASOPHILS # BLD AUTO: 0 K/UL — SIGNIFICANT CHANGE UP (ref 0–0.2)
BASOPHILS NFR BLD AUTO: 0.1 % — SIGNIFICANT CHANGE UP (ref 0–2)
BILIRUB UR-MCNC: NEGATIVE — SIGNIFICANT CHANGE UP
BLD GP AB SCN SERPL QL: SIGNIFICANT CHANGE UP
COLOR SPEC: YELLOW — SIGNIFICANT CHANGE UP
DIFF PNL FLD: NEGATIVE — SIGNIFICANT CHANGE UP
EOSINOPHIL # BLD AUTO: 0.1 K/UL — SIGNIFICANT CHANGE UP (ref 0–0.5)
EOSINOPHIL NFR BLD AUTO: 0.9 % — SIGNIFICANT CHANGE UP (ref 0–6)
GLUCOSE UR QL: NEGATIVE MG/DL — SIGNIFICANT CHANGE UP
HCT VFR BLD CALC: 34.5 % — LOW (ref 37–47)
HGB BLD-MCNC: 11.5 G/DL — LOW (ref 12–16)
KETONES UR-MCNC: ABNORMAL
LEUKOCYTE ESTERASE UR-ACNC: NEGATIVE — SIGNIFICANT CHANGE UP
LYMPHOCYTES # BLD AUTO: 1.4 K/UL — SIGNIFICANT CHANGE UP (ref 1–4.8)
LYMPHOCYTES # BLD AUTO: 20 % — SIGNIFICANT CHANGE UP (ref 20–55)
MCHC RBC-ENTMCNC: 33 PG — HIGH (ref 27–31)
MCHC RBC-ENTMCNC: 33.3 G/DL — SIGNIFICANT CHANGE UP (ref 32–36)
MCV RBC AUTO: 98.9 FL — SIGNIFICANT CHANGE UP (ref 81–99)
MONOCYTES # BLD AUTO: 0.5 K/UL — SIGNIFICANT CHANGE UP (ref 0–0.8)
MONOCYTES NFR BLD AUTO: 6.6 % — SIGNIFICANT CHANGE UP (ref 3–10)
NEUTROPHILS # BLD AUTO: 5 K/UL — SIGNIFICANT CHANGE UP (ref 1.8–8)
NEUTROPHILS NFR BLD AUTO: 71.7 % — SIGNIFICANT CHANGE UP (ref 37–73)
NITRITE UR-MCNC: NEGATIVE — SIGNIFICANT CHANGE UP
PH UR: 7 — SIGNIFICANT CHANGE UP (ref 5–8)
PLATELET # BLD AUTO: 109 K/UL — LOW (ref 150–400)
PROT UR-MCNC: NEGATIVE MG/DL — SIGNIFICANT CHANGE UP
RBC # BLD: 3.49 M/UL — LOW (ref 4.4–5.2)
RBC # FLD: 13.2 % — SIGNIFICANT CHANGE UP (ref 11–15.6)
SP GR SPEC: 1.01 — SIGNIFICANT CHANGE UP (ref 1.01–1.02)
TYPE + AB SCN PNL BLD: SIGNIFICANT CHANGE UP
UROBILINOGEN FLD QL: NEGATIVE MG/DL — SIGNIFICANT CHANGE UP
WBC # BLD: 7 K/UL — SIGNIFICANT CHANGE UP (ref 4.8–10.8)
WBC # FLD AUTO: 7 K/UL — SIGNIFICANT CHANGE UP (ref 4.8–10.8)

## 2019-05-16 PROCEDURE — 59510 CESAREAN DELIVERY: CPT | Mod: U7

## 2019-05-16 RX ORDER — TETANUS TOXOID, REDUCED DIPHTHERIA TOXOID AND ACELLULAR PERTUSSIS VACCINE, ADSORBED 5; 2.5; 8; 8; 2.5 [IU]/.5ML; [IU]/.5ML; UG/.5ML; UG/.5ML; UG/.5ML
0.5 SUSPENSION INTRAMUSCULAR ONCE
Refills: 0 | Status: DISCONTINUED | OUTPATIENT
Start: 2019-05-16 | End: 2019-05-19

## 2019-05-16 RX ORDER — ACETAMINOPHEN 500 MG
1000 TABLET ORAL ONCE
Refills: 0 | Status: COMPLETED | OUTPATIENT
Start: 2019-05-16 | End: 2019-05-17

## 2019-05-16 RX ORDER — SIMETHICONE 80 MG/1
80 TABLET, CHEWABLE ORAL EVERY 4 HOURS
Refills: 0 | Status: DISCONTINUED | OUTPATIENT
Start: 2019-05-16 | End: 2019-05-19

## 2019-05-16 RX ORDER — HYDROMORPHONE HYDROCHLORIDE 2 MG/ML
1 INJECTION INTRAMUSCULAR; INTRAVENOUS; SUBCUTANEOUS
Refills: 0 | Status: DISCONTINUED | OUTPATIENT
Start: 2019-05-17 | End: 2019-05-19

## 2019-05-16 RX ORDER — ACETAMINOPHEN 500 MG
975 TABLET ORAL EVERY 6 HOURS
Refills: 0 | Status: DISCONTINUED | OUTPATIENT
Start: 2019-05-17 | End: 2019-05-19

## 2019-05-16 RX ORDER — MAGNESIUM HYDROXIDE 400 MG/1
30 TABLET, CHEWABLE ORAL
Refills: 0 | Status: DISCONTINUED | OUTPATIENT
Start: 2019-05-16 | End: 2019-05-19

## 2019-05-16 RX ORDER — DIPHENHYDRAMINE HCL 50 MG
25 CAPSULE ORAL EVERY 4 HOURS
Refills: 0 | Status: DISCONTINUED | OUTPATIENT
Start: 2019-05-17 | End: 2019-05-19

## 2019-05-16 RX ORDER — ONDANSETRON 8 MG/1
4 TABLET, FILM COATED ORAL EVERY 6 HOURS
Refills: 0 | Status: DISCONTINUED | OUTPATIENT
Start: 2019-05-16 | End: 2019-05-19

## 2019-05-16 RX ORDER — KETOROLAC TROMETHAMINE 30 MG/ML
30 SYRINGE (ML) INJECTION EVERY 6 HOURS
Refills: 0 | Status: DISCONTINUED | OUTPATIENT
Start: 2019-05-16 | End: 2019-05-18

## 2019-05-16 RX ORDER — FAMOTIDINE 10 MG/ML
20 INJECTION INTRAVENOUS ONCE
Refills: 0 | Status: DISCONTINUED | OUTPATIENT
Start: 2019-05-16 | End: 2019-05-17

## 2019-05-16 RX ORDER — OXYCODONE HYDROCHLORIDE 5 MG/1
10 TABLET ORAL
Refills: 0 | Status: DISCONTINUED | OUTPATIENT
Start: 2019-05-17 | End: 2019-05-19

## 2019-05-16 RX ORDER — CEFAZOLIN SODIUM 1 G
2000 VIAL (EA) INJECTION ONCE
Refills: 0 | Status: COMPLETED | OUTPATIENT
Start: 2019-05-16 | End: 2019-05-16

## 2019-05-16 RX ORDER — SODIUM CHLORIDE 9 MG/ML
1000 INJECTION, SOLUTION INTRAVENOUS
Refills: 0 | Status: DISCONTINUED | OUTPATIENT
Start: 2019-05-16 | End: 2019-05-19

## 2019-05-16 RX ORDER — OXYTOCIN 10 UNIT/ML
333.33 VIAL (ML) INJECTION
Qty: 20 | Refills: 0 | Status: DISCONTINUED | OUTPATIENT
Start: 2019-05-16 | End: 2019-05-19

## 2019-05-16 RX ORDER — KETOROLAC TROMETHAMINE 30 MG/ML
30 SYRINGE (ML) INJECTION EVERY 6 HOURS
Refills: 0 | Status: DISCONTINUED | OUTPATIENT
Start: 2019-05-17 | End: 2019-05-18

## 2019-05-16 RX ORDER — LANOLIN
1 OINTMENT (GRAM) TOPICAL EVERY 6 HOURS
Refills: 0 | Status: DISCONTINUED | OUTPATIENT
Start: 2019-05-16 | End: 2019-05-19

## 2019-05-16 RX ORDER — ENOXAPARIN SODIUM 100 MG/ML
40 INJECTION SUBCUTANEOUS DAILY
Refills: 0 | Status: DISCONTINUED | OUTPATIENT
Start: 2019-05-17 | End: 2019-05-19

## 2019-05-16 RX ORDER — DOCUSATE SODIUM 100 MG
100 CAPSULE ORAL
Refills: 0 | Status: DISCONTINUED | OUTPATIENT
Start: 2019-05-16 | End: 2019-05-19

## 2019-05-16 RX ORDER — CITRIC ACID/SODIUM CITRATE 300-500 MG
30 SOLUTION, ORAL ORAL ONCE
Refills: 0 | Status: COMPLETED | OUTPATIENT
Start: 2019-05-16 | End: 2019-05-16

## 2019-05-16 RX ORDER — SODIUM CHLORIDE 9 MG/ML
1000 INJECTION, SOLUTION INTRAVENOUS
Refills: 0 | Status: DISCONTINUED | OUTPATIENT
Start: 2019-05-16 | End: 2019-05-17

## 2019-05-16 RX ORDER — IBUPROFEN 200 MG
600 TABLET ORAL EVERY 6 HOURS
Refills: 0 | Status: DISCONTINUED | OUTPATIENT
Start: 2019-05-16 | End: 2019-05-19

## 2019-05-16 RX ORDER — NALOXONE HYDROCHLORIDE 4 MG/.1ML
0.1 SPRAY NASAL
Refills: 0 | Status: DISCONTINUED | OUTPATIENT
Start: 2019-05-17 | End: 2019-05-19

## 2019-05-16 RX ORDER — DIPHENHYDRAMINE HCL 50 MG
25 CAPSULE ORAL EVERY 6 HOURS
Refills: 0 | Status: DISCONTINUED | OUTPATIENT
Start: 2019-05-16 | End: 2019-05-19

## 2019-05-16 RX ORDER — OXYCODONE HYDROCHLORIDE 5 MG/1
5 TABLET ORAL
Refills: 0 | Status: DISCONTINUED | OUTPATIENT
Start: 2019-05-16 | End: 2019-05-19

## 2019-05-16 RX ORDER — GLYCERIN ADULT
1 SUPPOSITORY, RECTAL RECTAL AT BEDTIME
Refills: 0 | Status: DISCONTINUED | OUTPATIENT
Start: 2019-05-16 | End: 2019-05-19

## 2019-05-16 RX ORDER — SODIUM CHLORIDE 9 MG/ML
1000 INJECTION, SOLUTION INTRAVENOUS ONCE
Refills: 0 | Status: COMPLETED | OUTPATIENT
Start: 2019-05-16 | End: 2019-05-16

## 2019-05-16 RX ORDER — METOCLOPRAMIDE HCL 10 MG
10 TABLET ORAL ONCE
Refills: 0 | Status: COMPLETED | OUTPATIENT
Start: 2019-05-16 | End: 2019-05-16

## 2019-05-16 RX ORDER — OXYCODONE HYDROCHLORIDE 5 MG/1
5 TABLET ORAL ONCE
Refills: 0 | Status: DISCONTINUED | OUTPATIENT
Start: 2019-05-16 | End: 2019-05-19

## 2019-05-16 RX ORDER — OXYCODONE HYDROCHLORIDE 5 MG/1
5 TABLET ORAL
Refills: 0 | Status: DISCONTINUED | OUTPATIENT
Start: 2019-05-17 | End: 2019-05-19

## 2019-05-16 RX ADMIN — Medication 30 MILLILITER(S): at 19:54

## 2019-05-16 RX ADMIN — Medication 30 MILLIGRAM(S): at 23:29

## 2019-05-16 RX ADMIN — Medication 100 MILLIGRAM(S): at 20:43

## 2019-05-16 RX ADMIN — SODIUM CHLORIDE 2000 MILLILITER(S): 9 INJECTION, SOLUTION INTRAVENOUS at 17:30

## 2019-05-16 RX ADMIN — Medication 1000 MILLIUNIT(S)/MIN: at 20:55

## 2019-05-16 RX ADMIN — Medication 30 MILLIGRAM(S): at 22:59

## 2019-05-16 RX ADMIN — SODIUM CHLORIDE 125 MILLILITER(S): 9 INJECTION, SOLUTION INTRAVENOUS at 19:24

## 2019-05-16 RX ADMIN — Medication 10 MILLIGRAM(S): at 23:10

## 2019-05-16 NOTE — OB RN DELIVERY SUMMARY - NS_LABORDURATION_OBGYN_ALL_OB_FT
Number Of Freeze-Thaw Cycles: 1 freeze-thaw cycle Detail Level: Simple Consent: The patient's consent was obtained including but not limited to risks of crusting, scabbing, blistering, scarring, darker or lighter pigmentary change, recurrence, incomplete removal and infection. Render Post-Care Instructions In Note?: no Post-Care Instructions: I reviewed with the patient in detail post-care instructions. Patient is to wear sunprotection, and avoid picking at any of the treated lesions. Pt may apply Vaseline to crusted or scabbing areas. Duration Of Freeze Thaw-Cycle (Seconds): 1 21 Hour(s) 55 Minute(s)

## 2019-05-16 NOTE — OB PROVIDER H&P - HISTORY OF PRESENT ILLNESS
32 y/o  at 38w4d presenting with 1 day history of lower abdominal and back pain. Denies vaginal bleeding, leakage of fluid. +FM  Prenatal course significant for low FARHAN-A.    ObGynHx: C/S x1 (, full term, failed IOL)   PMH/PSH: ADHD   Meds: PNV, ASA, Adderall   Allergies: fluoroquinolones

## 2019-05-16 NOTE — OB RN DELIVERY SUMMARY - NS_RNDELIVATTEST_OBGYN_ALL_OB
Laps, needles and instrument count was correct OB Provider reported that the vagina was inspected and no foreign body was found/Laps, needles and instrument count was correct

## 2019-05-16 NOTE — OB RN PATIENT PROFILE - NS_PARA_OBGYN_ALL_OB_NU
[Straight Catheterization] : insertion of a straight catheter [Other ___] : [unfilled] [Allergies Reviewed] : Allergies reviewed [None] : none [___ Fr Straight Tip] : a [unfilled] in [place] straight tip catheter [Clear] : clear [Culture] : culture [No Complications] : no complications [Tolerated Well] : the patient tolerated the procedure well [Post procedure instructions and information given] : Post procedure instructions and information were given and reviewed with patient. [0] : 0 1

## 2019-05-16 NOTE — OB RN DELIVERY SUMMARY - NS_GENERALBABYACOMMENTA_OBGYN_ALL_OB_FT
transported in open crib to NBN for transition by Natty Ivan RN. Pt put on FHR monitor in OR. Fetal heart rate prior to anesthesia was 125. Fetal heart rate check with doppler after anesthesia was 121. The hard copy fetal monitoring strip form the O.R. is saved in chart since it did not archive.  [place] transported in open crib to [facility] for transition by Natty Ivan RN.

## 2019-05-16 NOTE — OB RN PATIENT PROFILE - ALERT: PERTINENT HISTORY
20 Week Level II Sonogram/Ultra Screen at 12 Weeks/Fetal Non-Stress Test (NST)/Fetal Sonogram/1st Trimester Sonogram

## 2019-05-16 NOTE — OB PROVIDER H&P - ATTENDING COMMENTS
pt seen; 38 plus weeks p1 with prior  section, now in early labor with painful contractions, for elective repeat  section

## 2019-05-16 NOTE — OB RN DELIVERY SUMMARY - NS_SKINCOMMENTSA_OBGYN_ALL_OB_FT
Mom performed skin to skin in OR then baby transferred to Dad's chest for continuation of skin to skin, baby returned to mom's chest in PACU

## 2019-05-16 NOTE — OB PROVIDER H&P - NSHPPHYSICALEXAM_GEN_ALL_CORE
Vital Signs Last 24 Hrs  T(C): 36.6 (16 May 2019 15:01), Max: 36.6 (16 May 2019 15:01)  T(F): 97.9 (16 May 2019 15:01), Max: 97.9 (16 May 2019 15:01)  HR: 104 (16 May 2019 15:01) (104 - 104)  BP: 116/70 (16 May 2019 15:01) (116/70 - 116/70)  BP(mean): --  RR: 19 (16 May 2019 15:01) (19 - 19)  SpO2: --    FHT: baseline 155, moderate variability, +accels, no decels  [place]: nishant irregularly and regularly

## 2019-05-16 NOTE — OB NEONATOLOGY/PEDIATRICIAN DELIVERY SUMMARY - NSPEDSNEONOTESA_OBGYN_ALL_OB_FT
Neonatologist called to OR #2 by Chico Wolf MD to attend  repeat C/S for birth of this 38 4/7 weeks infant in labor.  Mother is a  33 year old G 3 , blood type A positive, serology NR HBsAg negative, GBS negative, HIV negative, Rubella immune mother with EDC 2019.  Allergic to Cipro, denies hypertension, denies diabetes, denies Asthma.  Social History: No history of alcohol/tobacco exposure obtained  FHx: non-contributory to the condition being treated or details of FH documented here  ROS: unable to obtain ()  Labor and Delivery.:  AROM 2019 @ C/S with clear amniotic fluid.  Upon delivery 2019@ 2055 hours, infant active, alert and responsive, placed on bed with warmer pad under radiant warmer, dried positioned and suctioned with bulb syringe.  Apgar score 9 and 9 at 1 and 5 minutes respectively.  Infant transferred to transition nursery for further management under Laura Hogan MD  Male.  Bwt:3960g

## 2019-05-16 NOTE — OB PROVIDER H&P - ALERT: PERTINENT HISTORY
Fetal Sonogram/1st Trimester Sonogram/20 Week Level II Sonogram/Ultra Screen at 12 Weeks/Fetal Non-Stress Test (NST)

## 2019-05-17 ENCOUNTER — RESULT REVIEW (OUTPATIENT)
Age: 34
End: 2019-05-17

## 2019-05-17 LAB
BASOPHILS # BLD AUTO: 0 K/UL — SIGNIFICANT CHANGE UP (ref 0–0.2)
BASOPHILS NFR BLD AUTO: 0.2 % — SIGNIFICANT CHANGE UP (ref 0–2)
EOSINOPHIL # BLD AUTO: 0.1 K/UL — SIGNIFICANT CHANGE UP (ref 0–0.5)
EOSINOPHIL NFR BLD AUTO: 0.9 % — SIGNIFICANT CHANGE UP (ref 0–6)
HCT VFR BLD CALC: 31.1 % — LOW (ref 37–47)
HGB BLD-MCNC: 10.4 G/DL — LOW (ref 12–16)
LYMPHOCYTES # BLD AUTO: 1.7 K/UL — SIGNIFICANT CHANGE UP (ref 1–4.8)
LYMPHOCYTES # BLD AUTO: 18.9 % — LOW (ref 20–55)
MCHC RBC-ENTMCNC: 33.3 PG — HIGH (ref 27–31)
MCHC RBC-ENTMCNC: 33.4 G/DL — SIGNIFICANT CHANGE UP (ref 32–36)
MCV RBC AUTO: 99.7 FL — HIGH (ref 81–99)
MONOCYTES # BLD AUTO: 0.6 K/UL — SIGNIFICANT CHANGE UP (ref 0–0.8)
MONOCYTES NFR BLD AUTO: 7 % — SIGNIFICANT CHANGE UP (ref 3–10)
NEUTROPHILS # BLD AUTO: 6.6 K/UL — SIGNIFICANT CHANGE UP (ref 1.8–8)
NEUTROPHILS NFR BLD AUTO: 72.7 % — SIGNIFICANT CHANGE UP (ref 37–73)
PLATELET # BLD AUTO: 91 K/UL — LOW (ref 150–400)
RBC # BLD: 3.12 M/UL — LOW (ref 4.4–5.2)
RBC # FLD: 13.3 % — SIGNIFICANT CHANGE UP (ref 11–15.6)
WBC # BLD: 9.1 K/UL — SIGNIFICANT CHANGE UP (ref 4.8–10.8)
WBC # FLD AUTO: 9.1 K/UL — SIGNIFICANT CHANGE UP (ref 4.8–10.8)

## 2019-05-17 PROCEDURE — 88304 TISSUE EXAM BY PATHOLOGIST: CPT | Mod: 26

## 2019-05-17 RX ADMIN — Medication 30 MILLIGRAM(S): at 06:17

## 2019-05-17 RX ADMIN — Medication 100 MILLIGRAM(S): at 11:58

## 2019-05-17 RX ADMIN — Medication 1000 MILLIGRAM(S): at 00:35

## 2019-05-17 RX ADMIN — Medication 975 MILLIGRAM(S): at 21:13

## 2019-05-17 RX ADMIN — Medication 100 MILLIGRAM(S): at 21:13

## 2019-05-17 RX ADMIN — Medication 400 MILLIGRAM(S): at 00:05

## 2019-05-17 RX ADMIN — Medication 30 MILLIGRAM(S): at 12:45

## 2019-05-17 RX ADMIN — Medication 30 MILLIGRAM(S): at 18:27

## 2019-05-17 RX ADMIN — Medication 30 MILLIGRAM(S): at 06:03

## 2019-05-17 RX ADMIN — Medication 25 MILLIGRAM(S): at 19:42

## 2019-05-17 RX ADMIN — SIMETHICONE 80 MILLIGRAM(S): 80 TABLET, CHEWABLE ORAL at 21:12

## 2019-05-17 RX ADMIN — Medication 25 MILLIGRAM(S): at 03:14

## 2019-05-17 RX ADMIN — ENOXAPARIN SODIUM 40 MILLIGRAM(S): 100 INJECTION SUBCUTANEOUS at 11:58

## 2019-05-17 RX ADMIN — Medication 975 MILLIGRAM(S): at 10:00

## 2019-05-17 RX ADMIN — Medication 30 MILLIGRAM(S): at 18:12

## 2019-05-17 RX ADMIN — Medication 975 MILLIGRAM(S): at 22:13

## 2019-05-17 RX ADMIN — Medication 975 MILLIGRAM(S): at 09:13

## 2019-05-17 RX ADMIN — Medication 30 MILLIGRAM(S): at 11:58

## 2019-05-17 NOTE — PROGRESS NOTE ADULT - ASSESSMENT
A/P   Section Day: 1  Patient is feeling well overall.     Continue the current pain medication.   Encourage ambulation and regular diet.   Continue DVT ppx  Plan to discharge on day 3 or 4 according to the normal criteria.

## 2019-05-17 NOTE — PROGRESS NOTE ADULT - SUBJECTIVE AND OBJECTIVE BOX
Postpartum Note,  Section  Patient is a 33y  s/p repeat  post-operative day 1.    Subjective:  No acute events overnight. The patient is feeling itchy despite benadryl.  She tolerated liquids, denies N/V.    Patient is having normal postpartum bleeding which is decreasing in amount.    She is breastfeeding and the baby is latching on.    Urinating appropriately into the rudd catheter, which will be removed this AM  -BM/+flatus.    Physical exam:    Vital Signs Last 24 Hrs  T(C): 36.7 (17 May 2019 05:30), Max: 36.7 (16 May 2019 19:16)  T(F): 98 (17 May 2019 05:30), Max: 98.06 (16 May 2019 19:16)  HR: 60 (17 May 2019 05:30) (60 - 104)  BP: 104/60 (17 May 2019 05:30) (102/62 - 128/66)  RR: 18 (17 May 2019 05:30) (12 - 21)  SpO2: 98% (17 May 2019 05:30) (96% - 100%)    Heart: RRR  Lungs: CTABL  Breast: non tender, not engorged   Abdomen: Soft, nontender, no distension, firm uterine fundus, the dressing was changed once overnight as was fully saturated, the second dressing was dry and was removed, the incision is clean dry and intact  Ext: No DVT signs, warm extremities    preop LABS:                        11.5   7.0   )-----------( 109      ( 16 May 2019 18:19 )             34.5     Postop labs pending

## 2019-05-17 NOTE — PROGRESS NOTE ADULT - SUBJECTIVE AND OBJECTIVE BOX
INTERVAL HPI/OVERNIGHT EVENTS:  33y Female s/p c section under spinal anesthesia with duramorph for post op analgesia on 5/16/19    Vital Signs Last 24 Hrs  T(C): 36.7 (17 May 2019 05:30), Max: 36.7 (16 May 2019 19:16)  T(F): 98 (17 May 2019 05:30), Max: 98.06 (16 May 2019 19:16)  HR: 60 (17 May 2019 05:30) (60 - 104)  BP: 104/60 (17 May 2019 05:30) (102/62 - 128/66)  BP(mean): --  RR: 18 (17 May 2019 05:30) (12 - 21)  SpO2: 98% (17 May 2019 05:30) (96% - 100%)    Patient seen, doing well, no anesthetic complications or complaints noted or reported.  Pain is controlled.

## 2019-05-18 ENCOUNTER — TRANSCRIPTION ENCOUNTER (OUTPATIENT)
Age: 34
End: 2019-05-18

## 2019-05-18 DIAGNOSIS — O28.0 ABNORMAL HEMATOLOGICAL FINDING ON ANTENATAL SCREENING OF MOTHER: ICD-10-CM

## 2019-05-18 DIAGNOSIS — Z3A.20 20 WEEKS GESTATION OF PREGNANCY: ICD-10-CM

## 2019-05-18 DIAGNOSIS — O09.891 SUPERVISION OF OTHER HIGH RISK PREGNANCIES, FIRST TRIMESTER: ICD-10-CM

## 2019-05-18 DIAGNOSIS — O09.899 ABNORMAL HEMATOLOGICAL FINDING ON ANTENATAL SCREENING OF MOTHER: ICD-10-CM

## 2019-05-18 LAB
MEV IGG SER-ACNC: >300 AU/ML — SIGNIFICANT CHANGE UP
MEV IGG+IGM SER-IMP: POSITIVE — SIGNIFICANT CHANGE UP
T PALLIDUM AB TITR SER: NEGATIVE — SIGNIFICANT CHANGE UP

## 2019-05-18 RX ORDER — IBUPROFEN 200 MG
1 TABLET ORAL
Qty: 30 | Refills: 0
Start: 2019-05-18

## 2019-05-18 RX ORDER — ASPIRIN/CALCIUM CARB/MAGNESIUM 324 MG
1 TABLET ORAL
Qty: 0 | Refills: 0 | DISCHARGE

## 2019-05-18 RX ORDER — IBUPROFEN 200 MG
600 TABLET ORAL EVERY 6 HOURS
Refills: 0 | Status: DISCONTINUED | OUTPATIENT
Start: 2019-05-18 | End: 2019-05-19

## 2019-05-18 RX ORDER — FOLIC ACID 0.8 MG
0 TABLET ORAL
Qty: 0 | Refills: 0 | DISCHARGE

## 2019-05-18 RX ADMIN — Medication 975 MILLIGRAM(S): at 22:09

## 2019-05-18 RX ADMIN — Medication 25 MILLIGRAM(S): at 00:30

## 2019-05-18 RX ADMIN — Medication 30 MILLIGRAM(S): at 06:29

## 2019-05-18 RX ADMIN — OXYCODONE HYDROCHLORIDE 5 MILLIGRAM(S): 5 TABLET ORAL at 10:52

## 2019-05-18 RX ADMIN — Medication 30 MILLIGRAM(S): at 00:27

## 2019-05-18 RX ADMIN — Medication 975 MILLIGRAM(S): at 15:37

## 2019-05-18 RX ADMIN — Medication 100 MILLIGRAM(S): at 21:09

## 2019-05-18 RX ADMIN — ENOXAPARIN SODIUM 40 MILLIGRAM(S): 100 INJECTION SUBCUTANEOUS at 14:34

## 2019-05-18 RX ADMIN — OXYCODONE HYDROCHLORIDE 10 MILLIGRAM(S): 5 TABLET ORAL at 17:50

## 2019-05-18 RX ADMIN — Medication 100 MILLIGRAM(S): at 10:52

## 2019-05-18 RX ADMIN — Medication 30 MILLIGRAM(S): at 00:42

## 2019-05-18 RX ADMIN — OXYCODONE HYDROCHLORIDE 5 MILLIGRAM(S): 5 TABLET ORAL at 14:43

## 2019-05-18 RX ADMIN — Medication 25 MILLIGRAM(S): at 21:09

## 2019-05-18 RX ADMIN — Medication 975 MILLIGRAM(S): at 21:09

## 2019-05-18 RX ADMIN — OXYCODONE HYDROCHLORIDE 5 MILLIGRAM(S): 5 TABLET ORAL at 15:37

## 2019-05-18 RX ADMIN — Medication 975 MILLIGRAM(S): at 14:34

## 2019-05-18 RX ADMIN — OXYCODONE HYDROCHLORIDE 10 MILLIGRAM(S): 5 TABLET ORAL at 18:45

## 2019-05-18 RX ADMIN — OXYCODONE HYDROCHLORIDE 5 MILLIGRAM(S): 5 TABLET ORAL at 11:30

## 2019-05-18 RX ADMIN — Medication 975 MILLIGRAM(S): at 11:30

## 2019-05-18 RX ADMIN — Medication 975 MILLIGRAM(S): at 10:52

## 2019-05-18 RX ADMIN — OXYCODONE HYDROCHLORIDE 10 MILLIGRAM(S): 5 TABLET ORAL at 23:54

## 2019-05-18 NOTE — DISCHARGE NOTE OB - CARE PLAN
Principal Discharge DX:	 delivery, delivered, current hospitalization  Goal:	rapid recovery  Assessment and plan of treatment:	Patient can transition to regular activity level and continue regular diet. Patient should follow up with Dr. Franco's office to schedule post-op follow up and incision check visits. Patient should contact doctor earlier should she develop persistent/increased vaginal bleeding or fever.

## 2019-05-18 NOTE — DISCHARGE NOTE OB - HOSPITAL COURSE
32 y/o  now POD#3 s/p  section. Patient transferred to post partum unit, uncomplicated hospital course. At the time of discharge patient was tolerating regular diet PO, ambulating, voiding, and having bowel movements and flatus. Pain well controlled with pain medications PRN.

## 2019-05-18 NOTE — DISCHARGE NOTE OB - CARE PROVIDER_API CALL
Mindy Elise)  Obstetrics and Gynecology  [address] [place] [address]  Phone: [phone]  Fax: [phone]  Follow Up Time:

## 2019-05-18 NOTE — PROGRESS NOTE ADULT - SUBJECTIVE AND OBJECTIVE BOX
33y year old  now POD#2 s/p  section at 38wks gestation.     No acute overnight events. Pain well controlled.  Patient is ambulating, +voiding, +Flatus, -BM  Reports minimal lochia.   +breast feeding, -breast tenderness    VS:   Vital Signs Last 24 Hrs  T(C): 36.8 (17 May 2019 22:14), Max: 36.8 (17 May 2019 09:40)  T(F): 98.2 (17 May 2019 22:14), Max: 98.2 (17 May 2019 09:40)  HR: 72 (17 May 2019 22:14) (60 - 72)  BP: 93/58 (17 May 2019 22:14) (93/58 - 99/69)  RR: 16 (17 May 2019 22:14) (16 - 18)  SpO2: 96% (17 May 2019 22:14) (95% - 96%)    Physical Exam:  General: NAD  Abdomen: soft, ND, firm fundus palpated at the umbilicus. Incision clean, dry, and intact.  Ext: nontender lower extremities, bilaterally.     Labs:                        10.4   9.1   )-----------( 91       ( 17 May 2019 09:01 )             31.1

## 2019-05-18 NOTE — DISCHARGE NOTE OB - PATIENT PORTAL LINK FT
You can access the [facility] Patient Portal, offered by [facility], by registering with the following website: [email or web address]

## 2019-05-18 NOTE — DISCHARGE NOTE OB - MEDICATION SUMMARY - MEDICATIONS TO TAKE
I will START or STAY ON the medications listed below when I get home from the hospital:    ibuprofen 600 mg oral tablet  -- 1 tab(s) by mouth every 6 hours, As Needed -for mild pain - for moderate pain   -- Do not take this drug if you are pregnant.  It is very important that you take or use this exactly as directed.  Do not skip doses or discontinue unless directed by your doctor.  May cause drowsiness or dizziness.  Obtain medical advice before taking any non-prescription drugs as some may affect the action of this medication.  Take with food or milk.    -- Indication: For Moderate pain    Percocet 5/325 oral tablet  -- 1 tab(s) by mouth every 6 hours, As Needed -for severe pain MDD:4   -- Caution federal law prohibits the transfer of this drug to any person other  than the person for whom it was prescribed.  May cause drowsiness.  Alcohol may intensify this effect.  Use care when operating dangerous machinery.  This prescription cannot be refilled.  This product contains acetaminophen.  Do not use  with any other product containing acetaminophen to prevent possible liver damage.  Using more of this medication than prescribed may cause serious breathing problems.    -- Indication: For severe pain

## 2019-05-18 NOTE — DISCHARGE NOTE OB - PLAN OF CARE
rapid recovery Patient can transition to regular activity level and continue regular diet. Patient should follow up with Dr. Franco's office to schedule post-op follow up and incision check visits. Patient should contact doctor earlier should she develop persistent/increased vaginal bleeding or fever.

## 2019-05-19 VITALS
DIASTOLIC BLOOD PRESSURE: 67 MMHG | TEMPERATURE: 98 F | SYSTOLIC BLOOD PRESSURE: 103 MMHG | RESPIRATION RATE: 18 BRPM | HEART RATE: 71 BPM

## 2019-05-19 RX ADMIN — Medication 600 MILLIGRAM(S): at 11:40

## 2019-05-19 RX ADMIN — Medication 100 MILLIGRAM(S): at 10:50

## 2019-05-19 RX ADMIN — OXYCODONE HYDROCHLORIDE 10 MILLIGRAM(S): 5 TABLET ORAL at 00:54

## 2019-05-19 RX ADMIN — Medication 600 MILLIGRAM(S): at 06:08

## 2019-05-19 RX ADMIN — Medication 600 MILLIGRAM(S): at 10:50

## 2019-05-19 RX ADMIN — Medication 600 MILLIGRAM(S): at 05:10

## 2019-05-19 NOTE — PROGRESS NOTE ADULT - ASSESSMENT
33y  s/p uncomplicated rCS POD #3 due to previous CS.   Post-op CBC reviewed and WNL. VSS.  Pt meeting appropriate milestones, desires to go home today.

## 2019-05-19 NOTE — PROGRESS NOTE ADULT - SUBJECTIVE AND OBJECTIVE BOX
Name: HAMILTON MOTLEY  MRN: [id number]  Date Admitted: 19  Location: [facility] 2018 ([facility] 2EST)  Attending: Marcell Wolf    All: Cipro (Unknown)  fluropyrimidines (Unknown)  isoniazid (Hives; Rash)    Post Partum Note:     HAMILTON MOTLEY is a 33y  s/p uncomplicated rCS POD #3 due to previous CS.    SUBJECTIVE:  No acute events overnight. Pain is well controlled with PRN pain medication. No problems with ambulating, voiding, or PO intake. Has had flatus but no BM. Denies N/V. Patient is having normal lochia which is decreasing.    She is breastfeeding and the baby is latching on.     OBJECTIVE:  Physical exam:  General: AOx3, NAD.  Abdomen: Soft, appropriately tender, nondistended, no guarding or rebound tenderness, firm uterine fundus at umbilicus, the incision is clean dry and intact with sutures and steristrips. No erythema or discharge.  Ext: No DVT signs, warm extremities.    Vital Signs Last 24 Hrs  T(C): 36.4 (19 May 2019 00:38), Max: 36.7 (18 May 2019 09:05)  T(F): 97.6 (19 May 2019 00:38), Max: 98.1 (18 May 2019 09:05)  HR: 70 (19 May 2019 00:38) (63 - 70)  BP: 95/63 (19 May 2019 00:38) (95/63 - 99/62)  RR: 18 (19 May 2019 00:38) (18 - 18)  SpO2: 99% (19 May 2019 00:38) (99% - 99%)    LABS:                        10.4   9.1   )-----------( 91       ( 17 May 2019 09:01 )             31.1

## 2019-05-19 NOTE — PROGRESS NOTE ADULT - PROBLEM SELECTOR PLAN 1
1. Routine post-partum care.  2. Continue Lovenox. Encourage ambulation - if pt is not ambulating please use SCDs for DVT ppx.  3. Regular diet.  4. Encourage mother-baby interaction.  5. Plan for discharge on post-partum day 3 or 4.

## 2019-05-19 NOTE — PROGRESS NOTE ADULT - ATTENDING COMMENTS
pt stable pod 1. cont care.   agree w above.
pt stable pod 3 sp cs.  agree w above.  plan dc home.
stable pod 3. cont care. agree w above.

## 2019-05-20 PROBLEM — O09.891 MEDICATION EXPOSURE DURING FIRST TRIMESTER OF PREGNANCY: Status: RESOLVED | Noted: 2018-10-12 | Resolved: 2019-05-20

## 2019-05-20 PROBLEM — O28.0 HIGH RISK PREGNANCY WITH LOW PAPPA: Status: RESOLVED | Noted: 2018-12-14 | Resolved: 2019-05-20

## 2019-05-20 PROBLEM — Z3A.20 20 WEEKS GESTATION OF PREGNANCY: Status: RESOLVED | Noted: 2019-01-11 | Resolved: 2019-05-20

## 2019-05-23 ENCOUNTER — APPOINTMENT (OUTPATIENT)
Dept: OBGYN | Facility: CLINIC | Age: 34
End: 2019-05-23
Payer: MEDICAID

## 2019-05-23 VITALS
DIASTOLIC BLOOD PRESSURE: 70 MMHG | BODY MASS INDEX: 27.97 KG/M2 | WEIGHT: 174 LBS | HEIGHT: 66 IN | SYSTOLIC BLOOD PRESSURE: 110 MMHG

## 2019-05-23 LAB — SURGICAL PATHOLOGY STUDY: SIGNIFICANT CHANGE UP

## 2019-05-23 PROCEDURE — 0503F POSTPARTUM CARE VISIT: CPT

## 2019-05-23 NOTE — HISTORY OF PRESENT ILLNESS
[0/10] : no pain reported [Fever] : no fever [Chills] : no chills [Nausea] : no nausea [Vomiting] : no vomiting [Clean/Dry/Intact] : clean, dry and intact [Erythema] : not erythematous [Normal] : the vagina was normal [Doing Well] : is doing well [Excellent Pain Control] : has excellent pain control [No Sign of Infection] : is showing no signs of infection [None] : None [de-identified] : 1 wk sp cs. denies depression

## 2019-05-29 ENCOUNTER — APPOINTMENT (OUTPATIENT)
Dept: OBGYN | Facility: CLINIC | Age: 34
End: 2019-05-29
Payer: MEDICAID

## 2019-05-29 VITALS
DIASTOLIC BLOOD PRESSURE: 60 MMHG | HEIGHT: 66 IN | WEIGHT: 174 LBS | BODY MASS INDEX: 27.97 KG/M2 | SYSTOLIC BLOOD PRESSURE: 120 MMHG

## 2019-05-29 PROCEDURE — 0503F POSTPARTUM CARE VISIT: CPT

## 2019-05-29 NOTE — HISTORY OF PRESENT ILLNESS
[Delivery Date: ___] : on [unfilled] [Repeat C/S] : delivered by  section (repeat) [Male] : Delivery History: baby boy [Breastfeeding] : currently nursing [S/Sx PP Depression] : no signs/symptoms of postpartum depression [None] : No associated symptoms are reported [Clean/Dry/Intact] : clean, dry and intact [Doing Well] : is doing well

## 2019-06-25 ENCOUNTER — APPOINTMENT (OUTPATIENT)
Dept: OBGYN | Facility: CLINIC | Age: 34
End: 2019-06-25
Payer: MEDICAID

## 2019-06-25 VITALS
SYSTOLIC BLOOD PRESSURE: 110 MMHG | DIASTOLIC BLOOD PRESSURE: 70 MMHG | WEIGHT: 153 LBS | BODY MASS INDEX: 24.59 KG/M2 | HEIGHT: 66 IN

## 2019-06-25 PROCEDURE — 0503F POSTPARTUM CARE VISIT: CPT

## 2019-06-25 NOTE — HISTORY OF PRESENT ILLNESS
[Delivery Date: ___] : on [unfilled] [Male] : Delivery History: baby boy [Repeat C/S] : delivered by  section (repeat) [Breastfeeding] : currently nursing [Clean/Dry/Intact] : clean, dry and intact [None] : No associated symptoms are reported [Back to Normal] : is back to normal in size [Doing Well] : is doing well [Normal] : the vagina was normal [de-identified] : start ortho micronor

## 2019-07-10 PROCEDURE — 86850 RBC ANTIBODY SCREEN: CPT

## 2019-07-10 PROCEDURE — 59025 FETAL NON-STRESS TEST: CPT

## 2019-07-10 PROCEDURE — 81003 URINALYSIS AUTO W/O SCOPE: CPT

## 2019-07-10 PROCEDURE — 86780 TREPONEMA PALLIDUM: CPT

## 2019-07-10 PROCEDURE — 85027 COMPLETE CBC AUTOMATED: CPT

## 2019-07-10 PROCEDURE — 86900 BLOOD TYPING SEROLOGIC ABO: CPT

## 2019-07-10 PROCEDURE — 86765 RUBEOLA ANTIBODY: CPT

## 2019-07-10 PROCEDURE — 88304 TISSUE EXAM BY PATHOLOGIST: CPT

## 2019-07-10 PROCEDURE — 86901 BLOOD TYPING SEROLOGIC RH(D): CPT

## 2019-07-10 PROCEDURE — 59050 FETAL MONITOR W/REPORT: CPT

## 2019-07-10 PROCEDURE — 36415 COLL VENOUS BLD VENIPUNCTURE: CPT

## 2019-07-10 PROCEDURE — G0463: CPT

## 2019-07-19 NOTE — OB RN DELIVERY SUMMARY - NS_DELBLDTRANSFUS_OBGYN_ALL_OB
Prior Authorization Approval    Authorization Effective Date: 6/9/2019  Authorization Expiration Date: 7/8/2020  Medication: Kuvan 100mg- approved  Approved Dose/Quantity: 100mg/30  Reference #: [id number]   Insurance Company: [employer] - Phone [phone] Fax [phone]  Expected CoPay:       CoPay Card Available:      Foundation Assistance Needed:    Which Pharmacy is filling the prescription (Not needed for infusion/clinic administered): [facility] - [address] [place] [address]  Pharmacy Notified:    Patient Notified:       No

## 2019-08-08 ENCOUNTER — APPOINTMENT (OUTPATIENT)
Dept: OBGYN | Facility: CLINIC | Age: 34
End: 2019-08-08
Payer: MEDICAID

## 2019-08-08 VITALS
HEART RATE: 80 BPM | BODY MASS INDEX: 24.27 KG/M2 | WEIGHT: 151 LBS | DIASTOLIC BLOOD PRESSURE: 85 MMHG | SYSTOLIC BLOOD PRESSURE: 115 MMHG | HEIGHT: 66 IN

## 2019-08-08 PROCEDURE — 99213 OFFICE O/P EST LOW 20 MIN: CPT

## 2019-10-23 ENCOUNTER — APPOINTMENT (OUTPATIENT)
Dept: OBGYN | Facility: CLINIC | Age: 34
End: 2019-10-23
Payer: COMMERCIAL

## 2019-10-23 VITALS
SYSTOLIC BLOOD PRESSURE: 110 MMHG | HEIGHT: 66 IN | DIASTOLIC BLOOD PRESSURE: 70 MMHG | WEIGHT: 145 LBS | BODY MASS INDEX: 23.3 KG/M2

## 2019-10-23 PROCEDURE — 99395 PREV VISIT EST AGE 18-39: CPT

## 2019-10-23 NOTE — PHYSICAL EXAM
[Awake] : awake [Acute Distress] : no acute distress [Alert] : alert [Tender] : non tender [Soft] : soft [Oriented x3] : oriented to person, place, and time [Normal] : uterus [No Bleeding] : there was no active vaginal bleeding [Uterine Adnexae] : were not tender and not enlarged [RRR, No Murmurs] : RRR, no murmurs [CTAB] : CTAB

## 2019-10-28 LAB — HPV HIGH+LOW RISK DNA PNL CVX: NOT DETECTED

## 2019-10-29 LAB — CYTOLOGY CVX/VAG DOC THIN PREP: NORMAL

## 2019-11-04 ENCOUNTER — APPOINTMENT (OUTPATIENT)
Dept: DERMATOLOGY | Facility: CLINIC | Age: 34
End: 2019-11-04

## 2020-08-28 ENCOUNTER — RESULT CHARGE (OUTPATIENT)
Age: 35
End: 2020-08-28

## 2020-08-28 ENCOUNTER — APPOINTMENT (OUTPATIENT)
Dept: OBGYN | Facility: CLINIC | Age: 35
End: 2020-08-28
Payer: COMMERCIAL

## 2020-08-28 VITALS
HEIGHT: 66 IN | WEIGHT: 145 LBS | SYSTOLIC BLOOD PRESSURE: 110 MMHG | DIASTOLIC BLOOD PRESSURE: 70 MMHG | BODY MASS INDEX: 23.3 KG/M2

## 2020-08-28 LAB — HCG UR QL: NEGATIVE

## 2020-08-28 PROCEDURE — 81025 URINE PREGNANCY TEST: CPT

## 2020-08-28 PROCEDURE — 99214 OFFICE O/P EST MOD 30 MIN: CPT

## 2020-09-02 LAB
BASOPHILS # BLD AUTO: 0.06 K/UL
BASOPHILS NFR BLD AUTO: 1.1 %
EOSINOPHIL # BLD AUTO: 0.13 K/UL
EOSINOPHIL NFR BLD AUTO: 2.4 %
HCT VFR BLD CALC: 39.6 %
HGB BLD-MCNC: 12.7 G/DL
IMM GRANULOCYTES NFR BLD AUTO: 0.2 %
LYMPHOCYTES # BLD AUTO: 2 K/UL
LYMPHOCYTES NFR BLD AUTO: 37.3 %
MAN DIFF?: NORMAL
MCHC RBC-ENTMCNC: 31 PG
MCHC RBC-ENTMCNC: 32.1 GM/DL
MCV RBC AUTO: 96.6 FL
MONOCYTES # BLD AUTO: 0.4 K/UL
MONOCYTES NFR BLD AUTO: 7.5 %
NEUTROPHILS # BLD AUTO: 2.76 K/UL
NEUTROPHILS NFR BLD AUTO: 51.5 %
PLATELET # BLD AUTO: 171 K/UL
RBC # BLD: 4.1 M/UL
RBC # FLD: 12.1 %
TSH SERPL-ACNC: 1.06 UIU/ML
WBC # FLD AUTO: 5.36 K/UL

## 2020-10-10 NOTE — ED STATDOCS - DATA REVIEWED, MDM
Clinic Visit Summary    January 06, 2020      LEONARD MENENDEZ Description:  Male YOB: 1951   MRN: [id number] Provider:  Laura Balderas M.D.    Primary Physician:  Mely Fox MD  Referring Physician:  Mainor Post MD     Insurance Information  [employer]/OTHER [id number] 1/23/2019  Patient Direct Billing 1/6/2020     Reason for Visit  Follow Up Appointment     Health History  Malignant neoplasm of prostate [ICD10] C61    Vitals (last recorded)  Heading     Test Name B/P P Weight (lb) (lb) Weight (kg) (kg)   1/6/2020 9:53 /87 85 243.39 110.4     Allergies (as reported by patient)  Allergy Type Name Reaction     No Known Drug Allergies        Current Medications (as reported by patient)  Drug Name Dose Strength Route   Eligard [leuprolide] 45 mg 45 mg Subcutaneous   metformin 1 tab 850 mg Oral   glipizide 1 tab 10 mg Oral   levothyroxine 1 tab 175 mcg Oral   oxybutynin chloride 1 tab 10 mg Oral        Medications prescribed today  Date Description Ordering Physician      Orders  Date Description Ordering Physician     Next Visit Information  Appointment Date Appointment Time Activity   1/6/2020 9:40 AM Registration - [facility]UP   1/6/2020 9:50 AM RN Follow Up   1/6/2020 10:00 AM OP FU Visit:4     Special instructions            Reminders    · If you did not schedule your follow up appointment at the time of your visit, please call the department at [phone].  · Please provide a copy of this summary of care to your other providers.  · Please remember to bring the following items to your next appointment:  o Current medications or a list of your current medications.  o Insurance card and a photo ID.  § Please bring insurance and ID cards with you every time you come.  For safety and billing reasons, we must have copies of these documents in your medical record and we must verify your identity on a regular basis.  o Primary care physician referral, if applicable.  § If you  are a member of an HMO or PPO that requires a referral before receiving specialty care, please obtain the referral from your primary care physician (PCP) prior to your appointment.  Please contact your insurance provider to learn if your coverage requires referrals to a specialist. If you do not have a valid referral, you may be asked to reschedule your visit.  Some referrals are valid for a certain number of weeks or visits; please keep track of how many weeks or visits have passed since obtaining the referral so that you will know when to ask your PCP for a new referral.  Obtaining referrals is your responsibility.  Your insurance provider will likely require you to pay the full cost of visits without a valid referral.        This summary document is based solely upon information made available to the [facility] staff as of 01/06/20.  Please notify us of additional information related to your care at [phone].     not available not available/vital signs

## 2020-10-16 ENCOUNTER — APPOINTMENT (OUTPATIENT)
Dept: DERMATOLOGY | Facility: CLINIC | Age: 35
End: 2020-10-16
Payer: MEDICAID

## 2020-10-16 PROCEDURE — 11103 TANGNTL BX SKIN EA SEP/ADDL: CPT

## 2020-10-16 PROCEDURE — 11102 TANGNTL BX SKIN SINGLE LES: CPT

## 2020-10-16 PROCEDURE — 99214 OFFICE O/P EST MOD 30 MIN: CPT | Mod: 25

## 2020-11-24 ENCOUNTER — ASOB RESULT (OUTPATIENT)
Age: 35
End: 2020-11-24

## 2020-11-24 ENCOUNTER — RESULT CHARGE (OUTPATIENT)
Age: 35
End: 2020-11-24

## 2020-11-24 ENCOUNTER — APPOINTMENT (OUTPATIENT)
Dept: OBGYN | Facility: CLINIC | Age: 35
End: 2020-11-24
Payer: MEDICAID

## 2020-11-24 VITALS
BODY MASS INDEX: 24.75 KG/M2 | SYSTOLIC BLOOD PRESSURE: 125 MMHG | WEIGHT: 154 LBS | HEART RATE: 81 BPM | DIASTOLIC BLOOD PRESSURE: 76 MMHG | HEIGHT: 66 IN

## 2020-11-24 PROCEDURE — 76856 US EXAM PELVIC COMPLETE: CPT | Mod: 59

## 2020-11-24 PROCEDURE — 99213 OFFICE O/P EST LOW 20 MIN: CPT

## 2020-11-24 PROCEDURE — 81025 URINE PREGNANCY TEST: CPT

## 2020-11-24 PROCEDURE — 76830 TRANSVAGINAL US NON-OB: CPT

## 2020-11-24 NOTE — HISTORY OF PRESENT ILLNESS
[FreeTextEntry1] : 35-year-old white female para 2 here for follow up visit. Patient had repeat  section in May of 2019 and is still currently nursing. She is on the progesterone only pill and has been reporting persistent vaginal bleeding. She presents today for ultrasound and discussion. UCG is negative.\par \par Ultrasound reveals 8.1 x 3.6 cm uterus with normal endometrium and normal adnexa. I reassured the patient regarding these findings. I discussed the situation at length with her that most likely her abnormal bleeding is just secondary to nursing. I encouraged her to continue nursing and if she finds her bleeding intolerable to consider an IUD as she is not sure about more children. Alternatively if she stops nursing we may consider regular oral contraceptives. Patient also had normal CBC.

## 2020-11-25 LAB
HCG UR QL: NEGATIVE
QUALITY CONTROL: YES

## 2021-03-19 ENCOUNTER — APPOINTMENT (OUTPATIENT)
Dept: DERMATOLOGY | Facility: CLINIC | Age: 36
End: 2021-03-19

## 2021-05-24 ENCOUNTER — APPOINTMENT (OUTPATIENT)
Dept: OBGYN | Facility: CLINIC | Age: 36
End: 2021-05-24
Payer: MEDICAID

## 2021-05-24 VITALS
WEIGHT: 154 LBS | BODY MASS INDEX: 24.17 KG/M2 | HEIGHT: 67 IN | SYSTOLIC BLOOD PRESSURE: 120 MMHG | DIASTOLIC BLOOD PRESSURE: 70 MMHG

## 2021-05-24 PROCEDURE — 99395 PREV VISIT EST AGE 18-39: CPT

## 2021-05-24 PROCEDURE — 99072 ADDL SUPL MATRL&STAF TM PHE: CPT

## 2021-05-24 NOTE — PHYSICAL EXAM
[Awake] : awake [Alert] : alert [Acute Distress] : no acute distress [Soft] : soft [Tender] : non tender [Oriented x3] : oriented to person, place, and time [Normal] : uterus [No Bleeding] : there was no active vaginal bleeding [Uterine Adnexae] : were not tender and not enlarged [RRR, No Murmurs] : RRR, no murmurs [CTAB] : CTAB

## 2021-05-25 LAB — HPV HIGH+LOW RISK DNA PNL CVX: NOT DETECTED

## 2021-06-01 LAB — CYTOLOGY CVX/VAG DOC THIN PREP: NORMAL

## 2021-08-13 ENCOUNTER — APPOINTMENT (OUTPATIENT)
Dept: DERMATOLOGY | Facility: CLINIC | Age: 36
End: 2021-08-13

## 2021-08-19 NOTE — OB PROVIDER H&P - NSLASTLIVEBIRTH_OBGYN_ALL_OB_DT
[Encouraged to increase physical activity] : Encouraged to increase physical activity [____ min/wk Activity] : [unfilled] min/wk activity [Good understanding] : Patient has a good understanding of disease, goals and obesity follow-up plan 01-Jan-2010

## 2021-10-21 RX ORDER — MICONAZOLE NITRATE 2 %
30-10-1-200 CREAM (GRAM) TOPICAL
Qty: 90 | Refills: 3 | Status: ACTIVE | COMMUNITY
Start: 2021-10-21 | End: 1900-01-01

## 2021-10-25 ENCOUNTER — NON-APPOINTMENT (OUTPATIENT)
Age: 36
End: 2021-10-25

## 2021-10-25 ENCOUNTER — APPOINTMENT (OUTPATIENT)
Dept: OBGYN | Facility: CLINIC | Age: 36
End: 2021-10-25
Payer: MEDICAID

## 2021-10-25 VITALS
DIASTOLIC BLOOD PRESSURE: 70 MMHG | BODY MASS INDEX: 25.43 KG/M2 | HEIGHT: 67 IN | SYSTOLIC BLOOD PRESSURE: 104 MMHG | WEIGHT: 162 LBS

## 2021-10-25 LAB
HCG UR QL: POSITIVE
QUALITY CONTROL: YES

## 2021-10-25 PROCEDURE — 99213 OFFICE O/P EST LOW 20 MIN: CPT | Mod: 25

## 2021-10-25 PROCEDURE — 76817 TRANSVAGINAL US OBSTETRIC: CPT

## 2021-10-25 PROCEDURE — 81025 URINE PREGNANCY TEST: CPT

## 2021-10-25 RX ORDER — MICONAZOLE NITRATE 2 %
30-10-1-200 CREAM (GRAM) TOPICAL
Qty: 90 | Refills: 3 | Status: ACTIVE | COMMUNITY
Start: 2021-10-25 | End: 1900-01-01

## 2021-10-25 NOTE — HISTORY OF PRESENT ILLNESS
[FreeTextEntry1] : A 35-year-old white female para 2 presents with positive UCG. Her LMP is 9 321 giving her an EDC of 6 1022 and EGA of 7 weeks and 3 days. She denies significant nausea or vomiting. She is currently on prenatal vitamins. She has had 2 prior full term C-sections. \par \par She has a medical history of ADHD and was on  Adderall that she is self DC'd at least until the end of her first trimester. She is currently on Zoloft.\par \par She is also complaining of possible yeast infection.

## 2021-10-25 NOTE — DISCUSSION/SUMMARY
[FreeTextEntry1] : I reassured the patient regarding the findings and sonogram. She also wishes to have MFM consultation as she had in her previous pregnancy regarding her use of Adderall etc. All her questions were answered.

## 2021-10-25 NOTE — PHYSICAL EXAM
[Labia Majora] : normal [Labia Minora] : normal [Normal] : normal [Uterine Adnexae] : normal [FreeTextEntry4] : discharge suggestive of candida

## 2021-10-25 NOTE — PROCEDURE
[Transvaginal OB Sonogram] : Transvaginal OB Sonogram [Intrauterine Pregnancy] : intrauterine pregnancy [Yolk Sac] : yolk sac present [Fetal Heart] : fetal heart present [Date: ___] : Date: [unfilled] [Current GA by Sonogram: ___ (wks)] : Current GA by Sonogram: [unfilled]Uwks [___ day(s)] : [unfilled] days [FreeTextEntry1] : PIPER

## 2021-10-26 ENCOUNTER — NON-APPOINTMENT (OUTPATIENT)
Age: 36
End: 2021-10-26

## 2021-10-27 RX ORDER — MULTIVIT-MIN 60/IRON FUM/FOLIC 27 MG-1 MG
27-1 TABLET ORAL DAILY
Qty: 90 | Refills: 1 | Status: ACTIVE | COMMUNITY
Start: 2021-10-27 | End: 1900-01-01

## 2021-11-03 ENCOUNTER — APPOINTMENT (OUTPATIENT)
Dept: ANTEPARTUM | Facility: CLINIC | Age: 36
End: 2021-11-03

## 2021-11-03 ENCOUNTER — APPOINTMENT (OUTPATIENT)
Dept: MATERNAL FETAL MEDICINE | Facility: CLINIC | Age: 36
End: 2021-11-03

## 2021-11-10 ENCOUNTER — APPOINTMENT (OUTPATIENT)
Dept: OBGYN | Facility: CLINIC | Age: 36
End: 2021-11-10
Payer: MEDICAID

## 2021-11-10 VITALS
SYSTOLIC BLOOD PRESSURE: 120 MMHG | BODY MASS INDEX: 25.43 KG/M2 | WEIGHT: 162 LBS | HEIGHT: 67 IN | DIASTOLIC BLOOD PRESSURE: 70 MMHG

## 2021-11-10 DIAGNOSIS — Z80.3 FAMILY HISTORY OF MALIGNANT NEOPLASM OF BREAST: ICD-10-CM

## 2021-11-10 DIAGNOSIS — Z87.59 PERSONAL HISTORY OF OTHER COMPLICATIONS OF PREGNANCY, CHILDBIRTH AND THE PUERPERIUM: ICD-10-CM

## 2021-11-10 PROCEDURE — 0501F PRENATAL FLOW SHEET: CPT

## 2021-11-12 ENCOUNTER — LABORATORY RESULT (OUTPATIENT)
Age: 36
End: 2021-11-12

## 2021-11-23 LAB
CLARI ADDITIONAL INFO: NORMAL
CLARI CHROMOSOME 13: NORMAL
CLARI CHROMOSOME 18: NORMAL
CLARI CHROMOSOME 21: NORMAL
CLARI SEX CHROMOSOMES: NORMAL
CLARITEST NIPT: NORMAL
MATERNAL WEIGHT (LBS):: 162
PLEASE INCLUDE GENDER RESULTS ON THIS REPORT:: NORMAL
TYPE OF PREGNANCY:: NORMAL

## 2021-11-24 ENCOUNTER — NON-APPOINTMENT (OUTPATIENT)
Age: 36
End: 2021-11-24

## 2021-12-06 ENCOUNTER — APPOINTMENT (OUTPATIENT)
Dept: OBGYN | Facility: CLINIC | Age: 36
End: 2021-12-06
Payer: MEDICAID

## 2021-12-06 VITALS
HEIGHT: 67 IN | BODY MASS INDEX: 26.68 KG/M2 | WEIGHT: 170 LBS | SYSTOLIC BLOOD PRESSURE: 120 MMHG | DIASTOLIC BLOOD PRESSURE: 70 MMHG

## 2021-12-06 PROCEDURE — 0502F SUBSEQUENT PRENATAL CARE: CPT

## 2022-01-10 ENCOUNTER — APPOINTMENT (OUTPATIENT)
Dept: OBGYN | Facility: CLINIC | Age: 37
End: 2022-01-10
Payer: MEDICAID

## 2022-01-10 ENCOUNTER — NON-APPOINTMENT (OUTPATIENT)
Age: 37
End: 2022-01-10

## 2022-01-10 VITALS
SYSTOLIC BLOOD PRESSURE: 114 MMHG | HEIGHT: 67 IN | DIASTOLIC BLOOD PRESSURE: 72 MMHG | WEIGHT: 171 LBS | BODY MASS INDEX: 26.84 KG/M2

## 2022-01-10 PROCEDURE — 0502F SUBSEQUENT PRENATAL CARE: CPT

## 2022-01-12 ENCOUNTER — LABORATORY RESULT (OUTPATIENT)
Age: 37
End: 2022-01-12

## 2022-01-27 ENCOUNTER — NON-APPOINTMENT (OUTPATIENT)
Age: 37
End: 2022-01-27

## 2022-01-31 ENCOUNTER — APPOINTMENT (OUTPATIENT)
Dept: ANTEPARTUM | Facility: CLINIC | Age: 37
End: 2022-01-31
Payer: MEDICAID

## 2022-01-31 ENCOUNTER — APPOINTMENT (OUTPATIENT)
Dept: OBGYN | Facility: CLINIC | Age: 37
End: 2022-01-31
Payer: MEDICAID

## 2022-01-31 ENCOUNTER — ASOB RESULT (OUTPATIENT)
Age: 37
End: 2022-01-31

## 2022-01-31 VITALS
HEIGHT: 67 IN | WEIGHT: 174 LBS | BODY MASS INDEX: 27.31 KG/M2 | SYSTOLIC BLOOD PRESSURE: 110 MMHG | DIASTOLIC BLOOD PRESSURE: 70 MMHG

## 2022-01-31 PROCEDURE — 0502F SUBSEQUENT PRENATAL CARE: CPT

## 2022-01-31 PROCEDURE — 76811 OB US DETAILED SNGL FETUS: CPT

## 2022-02-14 ENCOUNTER — NON-APPOINTMENT (OUTPATIENT)
Age: 37
End: 2022-02-14

## 2022-02-28 ENCOUNTER — APPOINTMENT (OUTPATIENT)
Dept: OBGYN | Facility: CLINIC | Age: 37
End: 2022-02-28

## 2022-03-07 ENCOUNTER — APPOINTMENT (OUTPATIENT)
Dept: OBGYN | Facility: CLINIC | Age: 37
End: 2022-03-07
Payer: MEDICAID

## 2022-03-07 VITALS
SYSTOLIC BLOOD PRESSURE: 120 MMHG | HEIGHT: 67 IN | BODY MASS INDEX: 28.41 KG/M2 | WEIGHT: 181 LBS | DIASTOLIC BLOOD PRESSURE: 78 MMHG

## 2022-03-07 PROCEDURE — 0502F SUBSEQUENT PRENATAL CARE: CPT

## 2022-03-17 ENCOUNTER — LABORATORY RESULT (OUTPATIENT)
Age: 37
End: 2022-03-17

## 2022-03-29 ENCOUNTER — APPOINTMENT (OUTPATIENT)
Dept: OBGYN | Facility: CLINIC | Age: 37
End: 2022-03-29
Payer: MEDICAID

## 2022-03-29 VITALS
WEIGHT: 185 LBS | DIASTOLIC BLOOD PRESSURE: 66 MMHG | HEIGHT: 67 IN | SYSTOLIC BLOOD PRESSURE: 96 MMHG | BODY MASS INDEX: 29.03 KG/M2

## 2022-03-29 PROCEDURE — 0502F SUBSEQUENT PRENATAL CARE: CPT

## 2022-04-02 ENCOUNTER — NON-APPOINTMENT (OUTPATIENT)
Age: 37
End: 2022-04-02

## 2022-04-02 LAB — BACTERIA GENITAL AEROBE CULT: ABNORMAL

## 2022-04-04 ENCOUNTER — NON-APPOINTMENT (OUTPATIENT)
Age: 37
End: 2022-04-04

## 2022-04-11 ENCOUNTER — APPOINTMENT (OUTPATIENT)
Dept: OBGYN | Facility: CLINIC | Age: 37
End: 2022-04-11
Payer: MEDICAID

## 2022-04-11 VITALS
WEIGHT: 187 LBS | DIASTOLIC BLOOD PRESSURE: 69 MMHG | SYSTOLIC BLOOD PRESSURE: 103 MMHG | BODY MASS INDEX: 29.35 KG/M2 | HEIGHT: 67 IN

## 2022-04-11 DIAGNOSIS — H60.502 UNSPECIFIED ACUTE NONINFECTIVE OTITIS EXTERNA, LEFT EAR: ICD-10-CM

## 2022-04-11 DIAGNOSIS — Z30.430 ENCOUNTER FOR INSERTION OF INTRAUTERINE CONTRACEPTIVE DEVICE: ICD-10-CM

## 2022-04-11 DIAGNOSIS — Z34.93 ENCOUNTER FOR SUPERVISION OF NORMAL PREGNANCY, UNSPECIFIED, THIRD TRIMESTER: ICD-10-CM

## 2022-04-11 DIAGNOSIS — Z86.19 PERSONAL HISTORY OF OTHER INFECTIOUS AND PARASITIC DISEASES: ICD-10-CM

## 2022-04-11 DIAGNOSIS — Z20.821 CONTACT WITH AND (SUSPECTED) EXPOSURE TO ZIKA VIRUS: ICD-10-CM

## 2022-04-11 DIAGNOSIS — Z87.42 PERSONAL HISTORY OF OTHER DISEASES OF THE FEMALE GENITAL TRACT: ICD-10-CM

## 2022-04-11 DIAGNOSIS — O09.529 SUPERVISION OF ELDERLY MULTIGRAVIDA, UNSPECIFIED TRIMESTER: ICD-10-CM

## 2022-04-11 DIAGNOSIS — Z32.01 ENCOUNTER FOR PREGNANCY TEST, RESULT POSITIVE: ICD-10-CM

## 2022-04-11 DIAGNOSIS — O34.219 MATERNAL CARE FOR UNSPECIFIED TYPE SCAR FROM PREVIOUS CESAREAN DELIVERY: ICD-10-CM

## 2022-04-11 DIAGNOSIS — Z01.419 ENCOUNTER FOR GYNECOLOGICAL EXAMINATION (GENERAL) (ROUTINE) W/OUT ABNORMAL FINDINGS: ICD-10-CM

## 2022-04-11 PROCEDURE — 0502F SUBSEQUENT PRENATAL CARE: CPT

## 2022-04-11 PROCEDURE — 99213 OFFICE O/P EST LOW 20 MIN: CPT | Mod: 25

## 2022-04-11 RX ORDER — TERCONAZOLE 4 MG/G
0.4 CREAM VAGINAL
Qty: 1 | Refills: 1 | Status: COMPLETED | COMMUNITY
Start: 2018-12-17 | End: 2022-04-11

## 2022-04-11 RX ORDER — NORETHINDRONE ACETATE AND ETHINYL ESTRADIOL 1; 20 MG/1; UG/1
1-20 TABLET ORAL DAILY
Qty: 90 | Refills: 2 | Status: COMPLETED | COMMUNITY
Start: 2021-02-04 | End: 2022-04-11

## 2022-04-11 RX ORDER — SERTRALINE HYDROCHLORIDE 100 MG/1
100 TABLET, FILM COATED ORAL
Qty: 30 | Refills: 3 | Status: COMPLETED | COMMUNITY
Start: 2022-01-03 | End: 2022-04-11

## 2022-04-11 RX ORDER — TERCONAZOLE 4 MG/G
0.4 CREAM VAGINAL
Qty: 1 | Refills: 0 | Status: COMPLETED | COMMUNITY
Start: 2022-03-29 | End: 2022-04-11

## 2022-04-11 RX ORDER — TERCONAZOLE 4 MG/G
0.4 CREAM VAGINAL
Qty: 1 | Refills: 1 | Status: COMPLETED | COMMUNITY
Start: 2021-10-21 | End: 2022-04-11

## 2022-04-11 RX ORDER — FLUCONAZOLE 150 MG/1
150 TABLET ORAL
Qty: 1 | Refills: 0 | Status: ACTIVE | COMMUNITY
Start: 2022-04-11 | End: 1900-01-01

## 2022-04-11 RX ORDER — NORETHINDRONE 0.35 MG/1
0.35 TABLET ORAL DAILY
Qty: 90 | Refills: 1 | Status: COMPLETED | COMMUNITY
Start: 2019-06-25 | End: 2022-04-11

## 2022-04-11 RX ORDER — MISOPROSTOL 100 UG/1
100 TABLET ORAL DAILY
Qty: 2 | Refills: 0 | Status: COMPLETED | COMMUNITY
Start: 2020-08-28 | End: 2022-04-11

## 2022-04-11 RX ORDER — DEXTROAMPHETAMINE SACCHARATE, AMPHETAMINE ASPARTATE MONOHYDRATE, DEXTROAMPHETAMINE SULFATE AND AMPHETAMINE SULFATE 3.75; 3.75; 3.75; 3.75 MG/1; MG/1; MG/1; MG/1
15 CAPSULE, EXTENDED RELEASE ORAL
Qty: 30 | Refills: 0 | Status: COMPLETED | COMMUNITY
Start: 2019-03-14 | End: 2022-04-11

## 2022-04-14 ENCOUNTER — APPOINTMENT (OUTPATIENT)
Dept: OBGYN | Facility: CLINIC | Age: 37
End: 2022-04-14

## 2022-04-14 ENCOUNTER — APPOINTMENT (OUTPATIENT)
Dept: ANTEPARTUM | Facility: CLINIC | Age: 37
End: 2022-04-14

## 2022-04-17 ENCOUNTER — NON-APPOINTMENT (OUTPATIENT)
Age: 37
End: 2022-04-17

## 2022-04-17 DIAGNOSIS — D69.6 OTHER DISEASES OF THE BLOOD AND BLOOD-FORMING ORGANS AND CERTAIN DISORDERS INVOLVING THE IMMUNE MECHANISM COMPLICATING PREGNANCY, UNSPECIFIED TRIMESTER: ICD-10-CM

## 2022-04-17 DIAGNOSIS — O99.119 OTHER DISEASES OF THE BLOOD AND BLOOD-FORMING ORGANS AND CERTAIN DISORDERS INVOLVING THE IMMUNE MECHANISM COMPLICATING PREGNANCY, UNSPECIFIED TRIMESTER: ICD-10-CM

## 2022-04-17 LAB
ALBUMIN SERPL ELPH-MCNC: 3.8 G/DL
ALP BLD-CCNC: 85 U/L
ALT SERPL-CCNC: 7 U/L
ANION GAP SERPL CALC-SCNC: 13 MMOL/L
AST SERPL-CCNC: 15 U/L
BASOPHILS # BLD AUTO: 0.02 K/UL
BASOPHILS NFR BLD AUTO: 0.3 %
BILIRUB SERPL-MCNC: 0.2 MG/DL
BUN SERPL-MCNC: 9 MG/DL
CALCIUM SERPL-MCNC: 9.3 MG/DL
CHLORIDE SERPL-SCNC: 104 MMOL/L
CO2 SERPL-SCNC: 20 MMOL/L
CREAT SERPL-MCNC: 0.63 MG/DL
EGFR: 118 ML/MIN/1.73M2
EOSINOPHIL # BLD AUTO: 0.04 K/UL
EOSINOPHIL NFR BLD AUTO: 0.6 %
GLUCOSE SERPL-MCNC: 76 MG/DL
HCT VFR BLD CALC: 34.2 %
HGB BLD-MCNC: 11.2 G/DL
IMM GRANULOCYTES NFR BLD AUTO: 0.4 %
LDH SERPL-CCNC: 156 U/L
LYMPHOCYTES # BLD AUTO: 1.3 K/UL
LYMPHOCYTES NFR BLD AUTO: 18.8 %
MAN DIFF?: NORMAL
MCHC RBC-ENTMCNC: 32.4 PG
MCHC RBC-ENTMCNC: 32.7 GM/DL
MCV RBC AUTO: 98.8 FL
MONOCYTES # BLD AUTO: 0.46 K/UL
MONOCYTES NFR BLD AUTO: 6.7 %
NEUTROPHILS # BLD AUTO: 5.05 K/UL
NEUTROPHILS NFR BLD AUTO: 73.2 %
PLATELET # BLD AUTO: 147 K/UL
POTASSIUM SERPL-SCNC: 3.9 MMOL/L
PROT SERPL-MCNC: 6.2 G/DL
RBC # BLD: 3.46 M/UL
RBC # FLD: 12.3 %
SODIUM SERPL-SCNC: 136 MMOL/L
URATE SERPL-MCNC: 3.8 MG/DL
WBC # FLD AUTO: 6.9 K/UL

## 2022-04-17 RX ORDER — FLUCONAZOLE 150 MG/1
150 TABLET ORAL
Qty: 1 | Refills: 0 | Status: ACTIVE | COMMUNITY
Start: 2022-04-17 | End: 1900-01-01

## 2022-04-19 ENCOUNTER — NON-APPOINTMENT (OUTPATIENT)
Age: 37
End: 2022-04-19

## 2022-04-19 DIAGNOSIS — K21.9 DISEASES OF THE DIGESTIVE SYSTEM COMPLICATING PREGNANCY, THIRD TRIMESTER: ICD-10-CM

## 2022-04-19 DIAGNOSIS — O99.613 DISEASES OF THE DIGESTIVE SYSTEM COMPLICATING PREGNANCY, THIRD TRIMESTER: ICD-10-CM

## 2022-04-19 RX ORDER — FAMOTIDINE 20 MG/1
20 TABLET, FILM COATED ORAL
Qty: 60 | Refills: 2 | Status: ACTIVE | COMMUNITY
Start: 2022-04-19 | End: 1900-01-01

## 2022-04-20 DIAGNOSIS — B37.3 CANDIDIASIS OF VULVA AND VAGINA: ICD-10-CM

## 2022-04-20 RX ORDER — FLUCONAZOLE 150 MG/1
150 TABLET ORAL
Qty: 3 | Refills: 0 | Status: ACTIVE | COMMUNITY
Start: 2022-04-20 | End: 1900-01-01

## 2022-04-25 ENCOUNTER — APPOINTMENT (OUTPATIENT)
Dept: OBGYN | Facility: CLINIC | Age: 37
End: 2022-04-25

## 2022-05-04 ENCOUNTER — APPOINTMENT (OUTPATIENT)
Dept: ANTEPARTUM | Facility: CLINIC | Age: 37
End: 2022-05-04
Payer: MEDICAID

## 2022-05-04 ENCOUNTER — APPOINTMENT (OUTPATIENT)
Dept: OBGYN | Facility: CLINIC | Age: 37
End: 2022-05-04
Payer: MEDICAID

## 2022-05-04 ENCOUNTER — ASOB RESULT (OUTPATIENT)
Age: 37
End: 2022-05-04

## 2022-05-04 VITALS
HEIGHT: 67 IN | DIASTOLIC BLOOD PRESSURE: 71 MMHG | WEIGHT: 191 LBS | SYSTOLIC BLOOD PRESSURE: 103 MMHG | BODY MASS INDEX: 29.98 KG/M2

## 2022-05-04 DIAGNOSIS — B00.9 HERPESVIRAL INFECTION, UNSPECIFIED: ICD-10-CM

## 2022-05-04 PROCEDURE — 0502F SUBSEQUENT PRENATAL CARE: CPT

## 2022-05-04 PROCEDURE — 76816 OB US FOLLOW-UP PER FETUS: CPT

## 2022-05-04 RX ORDER — VALACYCLOVIR 500 MG/1
500 TABLET, FILM COATED ORAL DAILY
Qty: 30 | Refills: 1 | Status: ACTIVE | COMMUNITY
Start: 2022-05-04 | End: 1900-01-01

## 2022-05-12 ENCOUNTER — OUTPATIENT (OUTPATIENT)
Dept: INPATIENT UNIT | Facility: HOSPITAL | Age: 37
LOS: 1 days | End: 2022-05-12
Payer: MEDICAID

## 2022-05-12 VITALS
SYSTOLIC BLOOD PRESSURE: 107 MMHG | RESPIRATION RATE: 16 BRPM | HEART RATE: 85 BPM | TEMPERATURE: 98 F | DIASTOLIC BLOOD PRESSURE: 65 MMHG

## 2022-05-12 VITALS — SYSTOLIC BLOOD PRESSURE: 98 MMHG | HEART RATE: 82 BPM | DIASTOLIC BLOOD PRESSURE: 55 MMHG

## 2022-05-12 DIAGNOSIS — O47.1 FALSE LABOR AT OR AFTER 37 COMPLETED WEEKS OF GESTATION: ICD-10-CM

## 2022-05-12 DIAGNOSIS — Z98.89 OTHER SPECIFIED POSTPROCEDURAL STATES: Chronic | ICD-10-CM

## 2022-05-12 LAB
APPEARANCE UR: CLEAR — SIGNIFICANT CHANGE UP
BILIRUB UR-MCNC: NEGATIVE — SIGNIFICANT CHANGE UP
COLOR SPEC: YELLOW — SIGNIFICANT CHANGE UP
DIFF PNL FLD: NEGATIVE — SIGNIFICANT CHANGE UP
GLUCOSE UR QL: NEGATIVE MG/DL — SIGNIFICANT CHANGE UP
KETONES UR-MCNC: ABNORMAL
LEUKOCYTE ESTERASE UR-ACNC: NEGATIVE — SIGNIFICANT CHANGE UP
NITRITE UR-MCNC: NEGATIVE — SIGNIFICANT CHANGE UP
PH UR: 7 — SIGNIFICANT CHANGE UP (ref 5–8)
PROT UR-MCNC: NEGATIVE — SIGNIFICANT CHANGE UP
SP GR SPEC: 1.01 — SIGNIFICANT CHANGE UP (ref 1.01–1.02)
UROBILINOGEN FLD QL: NEGATIVE MG/DL — SIGNIFICANT CHANGE UP

## 2022-05-12 PROCEDURE — 87800 DETECT AGNT MULT DNA DIREC: CPT

## 2022-05-12 PROCEDURE — G0463: CPT

## 2022-05-12 PROCEDURE — 87086 URINE CULTURE/COLONY COUNT: CPT

## 2022-05-12 PROCEDURE — 59025 FETAL NON-STRESS TEST: CPT

## 2022-05-12 PROCEDURE — 81003 URINALYSIS AUTO W/O SCOPE: CPT

## 2022-05-12 PROCEDURE — 36415 COLL VENOUS BLD VENIPUNCTURE: CPT

## 2022-05-12 PROCEDURE — 99234 HOSP IP/OBS SM DT SF/LOW 45: CPT

## 2022-05-12 RX ORDER — ACETAMINOPHEN 500 MG
975 TABLET ORAL ONCE
Refills: 0 | Status: COMPLETED | OUTPATIENT
Start: 2022-05-12 | End: 2022-05-12

## 2022-05-12 RX ADMIN — Medication 975 MILLIGRAM(S): at 21:35

## 2022-05-12 RX ADMIN — Medication 975 MILLIGRAM(S): at 21:00

## 2022-05-12 NOTE — OB PROVIDER TRIAGE NOTE - HISTORY OF PRESENT ILLNESS
HAMILTON MOTLEY is a 36y  at 36w6d GA by 1st trimester sono who presents to L&D for possible labor. Patient reports feeling cramping back pain and pelvic pressure for the past few days. Initially it only occurred at night and was mild, but today it occurred during the day and felt more severe, prompting her to come in. She also reports some pelvic pain that spreads to her upper legs when she walks. She has a history of yeast infections during this pregnancy and states that she currently has her usual discharge with no change in odor or presence of itching/irritation. She endorses a bifrontal headache and has a history of migraines, which are treated with nerve blocks. She reports "seeing stars" occasionally during this pregnancy and her outpatient provider is following her for that. She denies nausea/vomiting, urinary symptoms, vaginal bleeding, contractions, and leakage of fluid. She endorses good fetal movement.     Pregnancy course is significant for:  - Advanced maternal age  - Yeast infections    POB:   - G1: CS  @40 weeks for failure of progression  - G2: SAB   - G3: CS  @38 weeks  PGYN: -fibroids/-cysts, denied STD hx, denies abnormal PAPs  PMH: ADHD, kidney stones, migraines  PSH: CSx2  SH: Denies tobacco use, EtOH use and illicit drug use during the pregnancy; Feels safe at home  Meds: Adderall, Zoloft, PNV  All: fluoroquinolones HAMILTON MOTLEY is a 36y  at 35w6d GA by 1st trimester sono who presents to L&D for abdominal pain. Patient reports feeling cramping back pain and pelvic pressure for the past few days. Initially it only occurred at night and was mild, but today it occurred during the day and felt more severe, prompting her to come in. She also reports some pelvic pain that spreads to her upper legs when she walks. She has a history of yeast infections during this pregnancy and states that she currently has her usual discharge with no change in odor or presence of itching/irritation. She endorses a bifrontal headache and has a history of migraines, which are treated with nerve blocks. She reports "seeing stars" occasionally during this pregnancy and her outpatient provider is following her for that. She currently denies blurry vision, CP, SOB, N/V, fevers or chills. She denies urinary symptoms, vaginal bleeding, contractions, and leakage of fluid. She endorses good fetal movement.     Pregnancy course is significant for:  - Advanced maternal age  - persistent Yeast infections    POB:   - G1: CS  @40 weeks for failure of progression  - G2: SAB   - G3: CS  @38 weeks  PGYN: -fibroids/-cysts, denied STD hx, denies abnormal PAPs  PMH: ADHD, kidney stones, migraines  PSH: CSx2  SH: Denies tobacco use, EtOH use and illicit drug use during the pregnancy; Feels safe at home  Meds: Adderall, Zoloft, PNV  All: fluoroquinolones HAMILTON MOTLEY is a 36y  at 35w6d GA by 1st trimester sono who presents to L&D for abdominal pain. Patient reports feeling cramping back pain and pelvic pressure for the past few days. Initially it only occurred at night and was mild, but today it occurred during the day and felt more severe, prompting her to come in. She also reports some pelvic pain that spreads to her upper legs when she walks. She has a history of yeast infections during this pregnancy and states that she currently has her usual discharge with no change in odor or presence of itching/irritation. She endorses a bifrontal headache and has a history of migraines, which are treated with nerve blocks. She reports "seeing stars" occasionally during this pregnancy and her outpatient provider is following her for that. She currently denies blurry vision, CP, SOB, N/V, fevers or chills. She denies urinary symptoms, vaginal bleeding, contractions, and leakage of fluid. She endorses good fetal movement.     Pregnancy course is significant for:  - Advanced maternal age  - persistent Yeast infections  - anxiety in pregnancy  - Prior  x2     POB:   - G1: CS  @40 weeks for failure of progression  - G2: SAB   - G3: CS  @38 weeks due to PROM  PGYN: -fibroids/-cysts, denied STD hx, denies abnormal PAPs  PMH: ADHD, kidney stones, migraines  PSH: CSx2  SH: Denies tobacco use, EtOH use and illicit drug use during the pregnancy; Feels safe at home  Meds: Adderall, Zoloft, PNV  All: fluoroquinolones

## 2022-05-12 NOTE — OB RN TRIAGE NOTE - NSICDXPASTMEDICALHX_GEN_ALL_CORE_FT
PAST MEDICAL HISTORY:  ADD (attention deficit disorder)     Kidney stone     Nerve damage left side faciaql pain permanent after taking cipro

## 2022-05-12 NOTE — OB PROVIDER TRIAGE NOTE - NSOBPROVIDERNOTE_OBGYN_ALL_OB_FT
A/P: 36y  at 36w6d GA by 1st trimester sono who presents to L&D for possible labor. SVE closed, cervix long and high. SSE mild discharge, no bleeding. Likely due to vaginitis vs. UTI vs. fetal descent.  - Tylenol for discomfort  - UA, UCx, Affirm sent  - Encourage PO hydration  - Return precautions discussed  - Dispo: observe for an hour    Discussed with Dr. aCusey A/P: 36y  at 36w6d GA by 1st trimester sono who presents to L&D for possible labor. SVE closed, cervix long and high. SSE mild discharge, no bleeding. Likely due to vaginitis vs. UTI vs. fetal descent.  - Tylenol for discomfort  - UA, UCx, Affirm sent  - Encourage PO hydration  - Return precautions discussed  - Dispo: observe for an hour and repeat cervical exam     Discussed with Dr. Causey A/P: 36y  at 36w6d GA by 1st trimester sono who presents to L&D for possible labor. SVE closed, cervix long and high. SSE mild discharge, no bleeding. Likely due to vaginitis vs. UTI vs. fetal descent.  - Tylenol for discomfort  - UA, UCx, Affirm sent  - Encourage PO hydration  - Return precautions discussed  - Dispo: observe for an hour and repeat cervical exam     Discussed with Dr. Causey    - NST remains reactive  - Patient does not feel contractions  - UA without evidence of infection  - Pain likely musculoskeletal in nature. Heat packs, massage, stretching and belly bands discussed  - Return precautions reviewed  - Patient stable for discharge home with regular outpatient OB follow-up    Discussed with Dr. Causey A/P: 36y  at 36w6d GA by 1st trimester sono who presents to L&D for possible labor. SVE closed, cervix long and high. SSE mild discharge, no bleeding. Likely due to vaginitis vs. UTI vs. fetal descent.  - Tylenol for discomfort  - UA, UCx, Affirm sent  - Encourage PO hydration  - Return precautions discussed  - Dispo: observe for an hour and repeat cervical exam     Discussed with Dr. Causey    - NST remains reactive  - Patient does not feel contractions  - UA without evidence of infection  - Pain likely musculoskeletal in nature. Heat packs, massage, stretching and belly bands discussed  - Return precautions reviewed  - Patient stable for discharge home with regular outpatient OB follow-up    Discussed with Dr. Causey    Addendum:    Subjective Hx, Physical Exam, Laboratory & Imaging results reviewed.  I agree with the Resident Physician's assessment and plan of care, as discussed above.  Call, follow up, and return to Hospital parameters reviewed at length.  She was given the opportunity to ask questions and all were addressed.  Discharge home    Trey Causey, DO

## 2022-05-12 NOTE — OB RN TRIAGE NOTE - FALL HARM RISK - UNIVERSAL INTERVENTIONS
Bed in lowest position, wheels locked, appropriate side rails in place/Call bell, personal items and telephone in reach/Instruct patient to call for assistance before getting out of bed or chair/Non-slip footwear when patient is out of bed/[place] to call system/Physically safe environment - no spills, clutter or unnecessary equipment/Purposeful Proactive Rounding/Room/bathroom lighting operational, light cord in reach

## 2022-05-12 NOTE — OB RN TRIAGE NOTE - NS_GESTAGE_OBGYN_ALL_OB_FT
Include Z78.9 (Other Specified Conditions Influencing Health Status) As An Associated Diagnosis?: No
Detail Level: Detailed
Post-Care Instructions: I reviewed with the patient in detail post-care instructions. Patient is to wear sunprotection, and avoid picking at any of the treated lesions. Pt may apply Vaseline to crusted or scabbing areas.
Render Post-Care Instructions In Note?: yes
Medical Necessity Information: It is in your best interest to select a reason for this procedure from the list below. All of these items fulfill various CMS LCD requirements except the new and changing color options.
Medical Necessity Clause: This procedure was medically necessary because the lesions that were treated were:
Consent: The patient's consent was obtained including but not limited to risks of crusting, scabbing, blistering, scarring, darker or lighter pigmentary change, recurrence, incomplete removal and infection.
36w6d

## 2022-05-13 LAB
CANDIDA AB TITR SER: SIGNIFICANT CHANGE UP
G VAGINALIS DNA SPEC QL NAA+PROBE: SIGNIFICANT CHANGE UP
T VAGINALIS SPEC QL WET PREP: SIGNIFICANT CHANGE UP

## 2022-05-14 LAB
CULTURE RESULTS: SIGNIFICANT CHANGE UP
SPECIMEN SOURCE: SIGNIFICANT CHANGE UP

## 2022-05-17 ENCOUNTER — NON-APPOINTMENT (OUTPATIENT)
Age: 37
End: 2022-05-17

## 2022-05-18 ENCOUNTER — APPOINTMENT (OUTPATIENT)
Dept: OBGYN | Facility: CLINIC | Age: 37
End: 2022-05-18
Payer: MEDICAID

## 2022-05-18 VITALS
WEIGHT: 194 LBS | SYSTOLIC BLOOD PRESSURE: 120 MMHG | DIASTOLIC BLOOD PRESSURE: 70 MMHG | BODY MASS INDEX: 30.45 KG/M2 | HEIGHT: 67 IN

## 2022-05-18 PROCEDURE — 0502F SUBSEQUENT PRENATAL CARE: CPT

## 2022-05-23 ENCOUNTER — OUTPATIENT (OUTPATIENT)
Dept: OUTPATIENT SERVICES | Facility: HOSPITAL | Age: 37
LOS: 1 days | End: 2022-05-23
Payer: MEDICAID

## 2022-05-23 ENCOUNTER — OUTPATIENT (OUTPATIENT)
Dept: OUTPATIENT SERVICES | Facility: HOSPITAL | Age: 37
LOS: 1 days | End: 2022-05-23

## 2022-05-23 VITALS
RESPIRATION RATE: 17 BRPM | WEIGHT: 192.02 LBS | HEIGHT: 67 IN | SYSTOLIC BLOOD PRESSURE: 110 MMHG | HEART RATE: 86 BPM | TEMPERATURE: 99 F | DIASTOLIC BLOOD PRESSURE: 74 MMHG

## 2022-05-23 VITALS — HEART RATE: 86 BPM | SYSTOLIC BLOOD PRESSURE: 110 MMHG | DIASTOLIC BLOOD PRESSURE: 74 MMHG

## 2022-05-23 DIAGNOSIS — Z98.89 OTHER SPECIFIED POSTPROCEDURAL STATES: Chronic | ICD-10-CM

## 2022-05-23 DIAGNOSIS — O47.1 FALSE LABOR AT OR AFTER 37 COMPLETED WEEKS OF GESTATION: ICD-10-CM

## 2022-05-23 DIAGNOSIS — Z01.818 ENCOUNTER FOR OTHER PREPROCEDURAL EXAMINATION: ICD-10-CM

## 2022-05-23 LAB
ALBUMIN SERPL ELPH-MCNC: 3.4 G/DL — SIGNIFICANT CHANGE UP (ref 3.3–5.2)
ALP SERPL-CCNC: 108 U/L — SIGNIFICANT CHANGE UP (ref 40–120)
ALT FLD-CCNC: 9 U/L — SIGNIFICANT CHANGE UP
ANION GAP SERPL CALC-SCNC: 15 MMOL/L — SIGNIFICANT CHANGE UP (ref 5–17)
APPEARANCE UR: CLEAR — SIGNIFICANT CHANGE UP
APTT BLD: 26.5 SEC — LOW (ref 27.5–35.5)
AST SERPL-CCNC: 17 U/L — SIGNIFICANT CHANGE UP
BASOPHILS # BLD AUTO: 0.02 K/UL — SIGNIFICANT CHANGE UP (ref 0–0.2)
BASOPHILS NFR BLD AUTO: 0.2 % — SIGNIFICANT CHANGE UP (ref 0–2)
BILIRUB SERPL-MCNC: 0.3 MG/DL — LOW (ref 0.4–2)
BILIRUB UR-MCNC: NEGATIVE — SIGNIFICANT CHANGE UP
BLD GP AB SCN SERPL QL: SIGNIFICANT CHANGE UP
BUN SERPL-MCNC: 7.2 MG/DL — LOW (ref 8–20)
CALCIUM SERPL-MCNC: 8.7 MG/DL — SIGNIFICANT CHANGE UP (ref 8.6–10.2)
CHLORIDE SERPL-SCNC: 102 MMOL/L — SIGNIFICANT CHANGE UP (ref 98–107)
CO2 SERPL-SCNC: 19 MMOL/L — LOW (ref 22–29)
COLOR SPEC: YELLOW — SIGNIFICANT CHANGE UP
CREAT ?TM UR-MCNC: 21 MG/DL — SIGNIFICANT CHANGE UP
CREAT SERPL-MCNC: 0.51 MG/DL — SIGNIFICANT CHANGE UP (ref 0.5–1.3)
DIFF PNL FLD: NEGATIVE — SIGNIFICANT CHANGE UP
EGFR: 124 ML/MIN/1.73M2 — SIGNIFICANT CHANGE UP
EOSINOPHIL # BLD AUTO: 0.04 K/UL — SIGNIFICANT CHANGE UP (ref 0–0.5)
EOSINOPHIL NFR BLD AUTO: 0.5 % — SIGNIFICANT CHANGE UP (ref 0–6)
FIBRINOGEN PPP-MCNC: 579 MG/DL — HIGH (ref 330–520)
GLUCOSE SERPL-MCNC: 68 MG/DL — LOW (ref 70–99)
GLUCOSE UR QL: NEGATIVE MG/DL — SIGNIFICANT CHANGE UP
HCT VFR BLD CALC: 34 % — LOW (ref 34.5–45)
HGB BLD-MCNC: 11.5 G/DL — SIGNIFICANT CHANGE UP (ref 11.5–15.5)
IMM GRANULOCYTES NFR BLD AUTO: 0.5 % — SIGNIFICANT CHANGE UP (ref 0–1.5)
INR BLD: 0.91 RATIO — SIGNIFICANT CHANGE UP (ref 0.88–1.16)
KETONES UR-MCNC: ABNORMAL
LDH SERPL L TO P-CCNC: 210 U/L — HIGH (ref 98–192)
LEUKOCYTE ESTERASE UR-ACNC: NEGATIVE — SIGNIFICANT CHANGE UP
LYMPHOCYTES # BLD AUTO: 1.47 K/UL — SIGNIFICANT CHANGE UP (ref 1–3.3)
LYMPHOCYTES # BLD AUTO: 17.9 % — SIGNIFICANT CHANGE UP (ref 13–44)
MCHC RBC-ENTMCNC: 31.8 PG — SIGNIFICANT CHANGE UP (ref 27–34)
MCHC RBC-ENTMCNC: 33.8 GM/DL — SIGNIFICANT CHANGE UP (ref 32–36)
MCV RBC AUTO: 93.9 FL — SIGNIFICANT CHANGE UP (ref 80–100)
MONOCYTES # BLD AUTO: 0.37 K/UL — SIGNIFICANT CHANGE UP (ref 0–0.9)
MONOCYTES NFR BLD AUTO: 4.5 % — SIGNIFICANT CHANGE UP (ref 2–14)
NEUTROPHILS # BLD AUTO: 6.25 K/UL — SIGNIFICANT CHANGE UP (ref 1.8–7.4)
NEUTROPHILS NFR BLD AUTO: 76.4 % — SIGNIFICANT CHANGE UP (ref 43–77)
NITRITE UR-MCNC: NEGATIVE — SIGNIFICANT CHANGE UP
PH UR: 7 — SIGNIFICANT CHANGE UP (ref 5–8)
PLATELET # BLD AUTO: 113 K/UL — LOW (ref 150–400)
POTASSIUM SERPL-MCNC: 4 MMOL/L — SIGNIFICANT CHANGE UP (ref 3.5–5.3)
POTASSIUM SERPL-SCNC: 4 MMOL/L — SIGNIFICANT CHANGE UP (ref 3.5–5.3)
PROT ?TM UR-MCNC: 6 MG/DL — SIGNIFICANT CHANGE UP (ref 0–12)
PROT SERPL-MCNC: 6.2 G/DL — LOW (ref 6.6–8.7)
PROT UR-MCNC: NEGATIVE — SIGNIFICANT CHANGE UP
PROT/CREAT UR-RTO: 0.3 RATIO — HIGH
PROTHROM AB SERPL-ACNC: 10.6 SEC — SIGNIFICANT CHANGE UP (ref 10.5–13.4)
RBC # BLD: 3.62 M/UL — LOW (ref 3.8–5.2)
RBC # FLD: 13.7 % — SIGNIFICANT CHANGE UP (ref 10.3–14.5)
SODIUM SERPL-SCNC: 136 MMOL/L — SIGNIFICANT CHANGE UP (ref 135–145)
SP GR SPEC: 1 — LOW (ref 1.01–1.02)
URATE SERPL-MCNC: 4.3 MG/DL — SIGNIFICANT CHANGE UP (ref 2.4–5.7)
UROBILINOGEN FLD QL: NEGATIVE MG/DL — SIGNIFICANT CHANGE UP
WBC # BLD: 8.19 K/UL — SIGNIFICANT CHANGE UP (ref 3.8–10.5)
WBC # FLD AUTO: 8.19 K/UL — SIGNIFICANT CHANGE UP (ref 3.8–10.5)

## 2022-05-23 PROCEDURE — 84550 ASSAY OF BLOOD/URIC ACID: CPT

## 2022-05-23 PROCEDURE — 86901 BLOOD TYPING SEROLOGIC RH(D): CPT

## 2022-05-23 PROCEDURE — 81003 URINALYSIS AUTO W/O SCOPE: CPT

## 2022-05-23 PROCEDURE — 86900 BLOOD TYPING SEROLOGIC ABO: CPT

## 2022-05-23 PROCEDURE — 85025 COMPLETE CBC W/AUTO DIFF WBC: CPT

## 2022-05-23 PROCEDURE — 85610 PROTHROMBIN TIME: CPT

## 2022-05-23 PROCEDURE — 85730 THROMBOPLASTIN TIME PARTIAL: CPT

## 2022-05-23 PROCEDURE — 80053 COMPREHEN METABOLIC PANEL: CPT

## 2022-05-23 PROCEDURE — G0463: CPT

## 2022-05-23 PROCEDURE — 83615 LACTATE (LD) (LDH) ENZYME: CPT

## 2022-05-23 PROCEDURE — 84156 ASSAY OF PROTEIN URINE: CPT

## 2022-05-23 PROCEDURE — 85384 FIBRINOGEN ACTIVITY: CPT

## 2022-05-23 PROCEDURE — 59025 FETAL NON-STRESS TEST: CPT

## 2022-05-23 PROCEDURE — 86850 RBC ANTIBODY SCREEN: CPT

## 2022-05-23 PROCEDURE — 99234 HOSP IP/OBS SM DT SF/LOW 45: CPT

## 2022-05-23 PROCEDURE — 82570 ASSAY OF URINE CREATININE: CPT

## 2022-05-23 PROCEDURE — 36415 COLL VENOUS BLD VENIPUNCTURE: CPT

## 2022-05-23 RX ORDER — ACETAMINOPHEN 500 MG
1000 TABLET ORAL ONCE
Refills: 0 | Status: COMPLETED | OUTPATIENT
Start: 2022-05-23 | End: 2022-05-23

## 2022-05-23 RX ORDER — SODIUM CHLORIDE 9 MG/ML
1000 INJECTION, SOLUTION INTRAVENOUS ONCE
Refills: 0 | Status: COMPLETED | OUTPATIENT
Start: 2022-05-23 | End: 2022-05-23

## 2022-05-23 RX ADMIN — Medication 1000 MILLIGRAM(S): at 15:00

## 2022-05-23 RX ADMIN — SODIUM CHLORIDE 1000 MILLILITER(S): 9 INJECTION, SOLUTION INTRAVENOUS at 14:00

## 2022-05-23 RX ADMIN — Medication 400 MILLIGRAM(S): at 14:30

## 2022-05-23 NOTE — OB PROVIDER TRIAGE NOTE - HISTORY OF PRESENT ILLNESS
HAMILTON MOTLEY is a 35yo  at 37w3d presented to PST complaining of contractions, exhaustion, difficulty walking an lots of vaginal pressure.  Patient denies vaginal bleeding, dishcarge, LOF. Reports good fetal movement. Reports severe migraine headaches not relieved with over the counter tylenol.  Reports black floaters in front of her eyes. denies ruq pain. Denies any history of preeclampsia. Denies blood pressure issues before or in this pregnangy.    T(C): 37 (22 @ 12:51), Max: 37 (22 @ 12:47)  HR: 86 (22 @ 12:51) (86 - 86)  BP: 110/74 (22 @ 12:51) (110/74 - 110/74)  RR: 17 (22 @ 12:51) (17 - 17)    Gen: NAD, well-appearing  Heart: S1 S2, RRR  Lungs: CTAB  Abd: soft, gravid  Ext: non-edematous, non-tender   SVE: closed long posterior.  FHT: 140bpm, moderate variability, +acels, -decels  [place]: irregular contractions.

## 2022-05-23 NOTE — OB PST NOTE - FALL HARM RISK - UNIVERSAL INTERVENTIONS
Bed in lowest position, wheels locked, appropriate side rails in place/Call bell, personal items and telephone in reach/Instruct patient to call for assistance before getting out of bed or chair/Non-slip footwear when patient is out of bed/[place] to call system/Physically safe environment - no spills, clutter or unnecessary equipment/Purposeful Proactive Rounding/Room/bathroom lighting operational, light cord in reach

## 2022-05-23 NOTE — OB RN TRIAGE NOTE - FALL HARM RISK - UNIVERSAL INTERVENTIONS
Bed in lowest position, wheels locked, appropriate side rails in place/Call bell, personal items and telephone in reach/Instruct patient to call for assistance before getting out of bed or chair/Non-slip footwear when patient is out of bed/[place] to call system/Physically safe environment - no spills, clutter or unnecessary equipment/Purposeful Proactive Rounding/Room/bathroom lighting operational, light cord in reach

## 2022-05-24 ENCOUNTER — TRANSCRIPTION ENCOUNTER (OUTPATIENT)
Age: 37
End: 2022-05-24

## 2022-05-24 LAB — B-HEM STREP SPEC QL CULT: NORMAL

## 2022-05-25 ENCOUNTER — NON-APPOINTMENT (OUTPATIENT)
Age: 37
End: 2022-05-25

## 2022-05-25 ENCOUNTER — APPOINTMENT (OUTPATIENT)
Dept: OBGYN | Facility: CLINIC | Age: 37
End: 2022-05-25

## 2022-05-25 ENCOUNTER — TRANSCRIPTION ENCOUNTER (OUTPATIENT)
Age: 37
End: 2022-05-25

## 2022-05-25 ENCOUNTER — INPATIENT (INPATIENT)
Facility: HOSPITAL | Age: 37
LOS: 1 days | Discharge: HOME | End: 2022-05-27
Payer: MEDICAID

## 2022-05-25 VITALS — SYSTOLIC BLOOD PRESSURE: 124 MMHG | HEART RATE: 73 BPM | DIASTOLIC BLOOD PRESSURE: 77 MMHG

## 2022-05-25 VITALS
BODY MASS INDEX: 30.76 KG/M2 | SYSTOLIC BLOOD PRESSURE: 112 MMHG | HEIGHT: 67 IN | DIASTOLIC BLOOD PRESSURE: 78 MMHG | HEART RATE: 111 BPM | WEIGHT: 196 LBS

## 2022-05-25 DIAGNOSIS — O47.03 FALSE LABOR BEFORE 37 COMPLETED WEEKS OF GESTATION, THIRD TRIMESTER: ICD-10-CM

## 2022-05-25 DIAGNOSIS — O26.893 OTHER SPECIFIED PREGNANCY RELATED CONDITIONS, THIRD TRIMESTER: ICD-10-CM

## 2022-05-25 DIAGNOSIS — Z98.89 OTHER SPECIFIED POSTPROCEDURAL STATES: Chronic | ICD-10-CM

## 2022-05-25 DIAGNOSIS — Z98.890 OTHER SPECIFIED POSTPROCEDURAL STATES: Chronic | ICD-10-CM

## 2022-05-25 LAB
APPEARANCE UR: ABNORMAL
BACTERIA # UR AUTO: ABNORMAL
BASOPHILS # BLD AUTO: 0.03 K/UL — SIGNIFICANT CHANGE UP (ref 0–0.2)
BASOPHILS NFR BLD AUTO: 0.4 % — SIGNIFICANT CHANGE UP (ref 0–2)
BILIRUB UR-MCNC: NEGATIVE — SIGNIFICANT CHANGE UP
BLD GP AB SCN SERPL QL: SIGNIFICANT CHANGE UP
COLOR SPEC: YELLOW — SIGNIFICANT CHANGE UP
DIFF PNL FLD: NEGATIVE — SIGNIFICANT CHANGE UP
EOSINOPHIL # BLD AUTO: 0.04 K/UL — SIGNIFICANT CHANGE UP (ref 0–0.5)
EOSINOPHIL NFR BLD AUTO: 0.5 % — SIGNIFICANT CHANGE UP (ref 0–6)
EPI CELLS # UR: ABNORMAL
GLUCOSE UR QL: NEGATIVE MG/DL — SIGNIFICANT CHANGE UP
HCT VFR BLD CALC: 31.8 % — LOW (ref 34.5–45)
HGB BLD-MCNC: 10.9 G/DL — LOW (ref 11.5–15.5)
HIV 1 & 2 AB SERPL IA.RAPID: SIGNIFICANT CHANGE UP
IMM GRANULOCYTES NFR BLD AUTO: 0.6 % — SIGNIFICANT CHANGE UP (ref 0–1.5)
KETONES UR-MCNC: ABNORMAL
LEUKOCYTE ESTERASE UR-ACNC: ABNORMAL
LYMPHOCYTES # BLD AUTO: 1.85 K/UL — SIGNIFICANT CHANGE UP (ref 1–3.3)
LYMPHOCYTES # BLD AUTO: 21.9 % — SIGNIFICANT CHANGE UP (ref 13–44)
MCHC RBC-ENTMCNC: 32 PG — SIGNIFICANT CHANGE UP (ref 27–34)
MCHC RBC-ENTMCNC: 34.3 GM/DL — SIGNIFICANT CHANGE UP (ref 32–36)
MCV RBC AUTO: 93.3 FL — SIGNIFICANT CHANGE UP (ref 80–100)
MONOCYTES # BLD AUTO: 0.43 K/UL — SIGNIFICANT CHANGE UP (ref 0–0.9)
MONOCYTES NFR BLD AUTO: 5.1 % — SIGNIFICANT CHANGE UP (ref 2–14)
NEUTROPHILS # BLD AUTO: 6.03 K/UL — SIGNIFICANT CHANGE UP (ref 1.8–7.4)
NEUTROPHILS NFR BLD AUTO: 71.5 % — SIGNIFICANT CHANGE UP (ref 43–77)
NITRITE UR-MCNC: NEGATIVE — SIGNIFICANT CHANGE UP
PH UR: 7 — SIGNIFICANT CHANGE UP (ref 5–8)
PLATELET # BLD AUTO: 118 K/UL — LOW (ref 150–400)
PROT UR-MCNC: NEGATIVE — SIGNIFICANT CHANGE UP
RBC # BLD: 3.41 M/UL — LOW (ref 3.8–5.2)
RBC # FLD: 13.8 % — SIGNIFICANT CHANGE UP (ref 10.3–14.5)
RBC CASTS # UR COMP ASSIST: SIGNIFICANT CHANGE UP /HPF (ref 0–4)
SP GR SPEC: 1.01 — SIGNIFICANT CHANGE UP (ref 1.01–1.02)
UROBILINOGEN FLD QL: NEGATIVE MG/DL — SIGNIFICANT CHANGE UP
WBC # BLD: 8.43 K/UL — SIGNIFICANT CHANGE UP (ref 3.8–10.5)
WBC # FLD AUTO: 8.43 K/UL — SIGNIFICANT CHANGE UP (ref 3.8–10.5)
WBC UR QL: ABNORMAL /HPF (ref 0–5)

## 2022-05-25 RX ORDER — KETOROLAC TROMETHAMINE 30 MG/ML
30 SYRINGE (ML) INJECTION EVERY 6 HOURS
Refills: 0 | Status: DISCONTINUED | OUTPATIENT
Start: 2022-05-25 | End: 2022-05-26

## 2022-05-25 RX ORDER — OXYTOCIN 10 UNIT/ML
333.33 VIAL (ML) INJECTION
Qty: 20 | Refills: 0 | Status: DISCONTINUED | OUTPATIENT
Start: 2022-05-25 | End: 2022-05-27

## 2022-05-25 RX ORDER — TETANUS TOXOID, REDUCED DIPHTHERIA TOXOID AND ACELLULAR PERTUSSIS VACCINE, ADSORBED 5; 2.5; 8; 8; 2.5 [IU]/.5ML; [IU]/.5ML; UG/.5ML; UG/.5ML; UG/.5ML
0.5 SUSPENSION INTRAMUSCULAR ONCE
Refills: 0 | Status: DISCONTINUED | OUTPATIENT
Start: 2022-05-25 | End: 2022-05-27

## 2022-05-25 RX ORDER — OXYTOCIN 10 UNIT/ML
333.33 VIAL (ML) INJECTION
Qty: 20 | Refills: 0 | Status: COMPLETED | OUTPATIENT
Start: 2022-05-25 | End: 2022-05-26

## 2022-05-25 RX ORDER — DEXTROAMPHETAMINE SACCHARATE, AMPHETAMINE ASPARTATE, DEXTROAMPHETAMINE SULFATE AND AMPHETAMINE SULFATE 1.875; 1.875; 1.875; 1.875 MG/1; MG/1; MG/1; MG/1
20 TABLET ORAL
Refills: 0 | Status: DISCONTINUED | OUTPATIENT
Start: 2022-05-25 | End: 2022-05-27

## 2022-05-25 RX ORDER — SODIUM CHLORIDE 9 MG/ML
1000 INJECTION, SOLUTION INTRAVENOUS
Refills: 0 | Status: DISCONTINUED | OUTPATIENT
Start: 2022-05-25 | End: 2022-05-27

## 2022-05-25 RX ORDER — SODIUM CHLORIDE 9 MG/ML
1000 INJECTION, SOLUTION INTRAVENOUS ONCE
Refills: 0 | Status: COMPLETED | OUTPATIENT
Start: 2022-05-25 | End: 2022-05-25

## 2022-05-25 RX ORDER — SIMETHICONE 80 MG/1
80 TABLET, CHEWABLE ORAL EVERY 4 HOURS
Refills: 0 | Status: DISCONTINUED | OUTPATIENT
Start: 2022-05-25 | End: 2022-05-27

## 2022-05-25 RX ORDER — SODIUM CHLORIDE 9 MG/ML
1000 INJECTION, SOLUTION INTRAVENOUS
Refills: 0 | Status: DISCONTINUED | OUTPATIENT
Start: 2022-05-25 | End: 2022-05-26

## 2022-05-25 RX ORDER — SERTRALINE 25 MG/1
100 TABLET, FILM COATED ORAL DAILY
Refills: 0 | Status: DISCONTINUED | OUTPATIENT
Start: 2022-05-25 | End: 2022-05-27

## 2022-05-25 RX ORDER — CEFAZOLIN SODIUM 1 G
2000 VIAL (EA) INJECTION ONCE
Refills: 0 | Status: COMPLETED | OUTPATIENT
Start: 2022-05-25 | End: 2022-05-25

## 2022-05-25 RX ORDER — DIPHENHYDRAMINE HCL 50 MG
25 CAPSULE ORAL EVERY 6 HOURS
Refills: 0 | Status: COMPLETED | OUTPATIENT
Start: 2022-05-25 | End: 2023-04-23

## 2022-05-25 RX ORDER — OXYCODONE HYDROCHLORIDE 5 MG/1
5 TABLET ORAL
Refills: 0 | Status: COMPLETED | OUTPATIENT
Start: 2022-05-25 | End: 2022-06-01

## 2022-05-25 RX ORDER — ACETAMINOPHEN 500 MG
975 TABLET ORAL
Refills: 0 | Status: DISCONTINUED | OUTPATIENT
Start: 2022-05-25 | End: 2022-05-27

## 2022-05-25 RX ORDER — LANOLIN
1 OINTMENT (GRAM) TOPICAL EVERY 6 HOURS
Refills: 0 | Status: DISCONTINUED | OUTPATIENT
Start: 2022-05-25 | End: 2022-05-27

## 2022-05-25 RX ORDER — ENOXAPARIN SODIUM 100 MG/ML
40 INJECTION SUBCUTANEOUS EVERY 24 HOURS
Refills: 0 | Status: DISCONTINUED | OUTPATIENT
Start: 2022-05-26 | End: 2022-05-27

## 2022-05-25 RX ORDER — CITRIC ACID/SODIUM CITRATE 300-500 MG
30 SOLUTION, ORAL ORAL ONCE
Refills: 0 | Status: COMPLETED | OUTPATIENT
Start: 2022-05-25 | End: 2022-05-25

## 2022-05-25 RX ORDER — FAMOTIDINE 10 MG/ML
20 INJECTION INTRAVENOUS ONCE
Refills: 0 | Status: COMPLETED | OUTPATIENT
Start: 2022-05-25 | End: 2022-05-25

## 2022-05-25 RX ORDER — OXYCODONE HYDROCHLORIDE 5 MG/1
5 TABLET ORAL ONCE
Refills: 0 | Status: DISCONTINUED | OUTPATIENT
Start: 2022-05-25 | End: 2022-05-27

## 2022-05-25 RX ORDER — MAGNESIUM HYDROXIDE 400 MG/1
30 TABLET, CHEWABLE ORAL
Refills: 0 | Status: DISCONTINUED | OUTPATIENT
Start: 2022-05-25 | End: 2022-05-27

## 2022-05-25 RX ORDER — DEXTROAMPHETAMINE SACCHARATE, AMPHETAMINE ASPARTATE, DEXTROAMPHETAMINE SULFATE AND AMPHETAMINE SULFATE 1.875; 1.875; 1.875; 1.875 MG/1; MG/1; MG/1; MG/1
25 TABLET ORAL
Refills: 0 | Status: DISCONTINUED | OUTPATIENT
Start: 2022-05-25 | End: 2022-05-25

## 2022-05-25 RX ORDER — IBUPROFEN 200 MG
600 TABLET ORAL EVERY 6 HOURS
Refills: 0 | Status: COMPLETED | OUTPATIENT
Start: 2022-05-25 | End: 2023-04-23

## 2022-05-25 RX ORDER — TRANEXAMIC ACID 100 MG/ML
1000 INJECTION, SOLUTION INTRAVENOUS ONCE
Refills: 0 | Status: COMPLETED | OUTPATIENT
Start: 2022-05-25 | End: 2022-05-25

## 2022-05-25 RX ADMIN — TRANEXAMIC ACID 220 MILLIGRAM(S): 100 INJECTION, SOLUTION INTRAVENOUS at 22:34

## 2022-05-25 RX ADMIN — FAMOTIDINE 20 MILLIGRAM(S): 10 INJECTION INTRAVENOUS at 22:18

## 2022-05-25 RX ADMIN — Medication 30 MILLILITER(S): at 22:18

## 2022-05-25 RX ADMIN — Medication 100 MILLIGRAM(S): at 22:25

## 2022-05-25 RX ADMIN — SODIUM CHLORIDE 2000 MILLILITER(S): 9 INJECTION, SOLUTION INTRAVENOUS at 17:45

## 2022-05-25 RX ADMIN — SODIUM CHLORIDE 1000 MILLILITER(S): 9 INJECTION, SOLUTION INTRAVENOUS at 17:47

## 2022-05-25 NOTE — OB PROVIDER DELIVERY SUMMARY - NSSELHIDDEN_OBGYN_ALL_OB_FT
[NS_DeliveryRN_OBGYN_ALL_OB_FT:[id number]],[NS_DeliveryAttending1_OBGYN_ALL_OB_FT:[id number]]

## 2022-05-25 NOTE — OB RN TRIAGE NOTE - FALL HARM RISK - FALL HARM RISK
No indicators present Surgeon/Pathologist Verbiage (Will Incorporate Name Of Surgeon From Intro If Not Blank): operated in two distinct and integrated capacities as the surgeon and pathologist.

## 2022-05-25 NOTE — OB PROVIDER H&P - HISTORY OF PRESENT ILLNESS
Meka    37yo  at 37w5d who presents to L&D for rule out labor.    MARY LOU: 6/10/22  LMP: 9/3/21    ObHx:  G1: pCS 2/2 failure to progress (; [id number])  G2: SAB with D&C ()  G3: rCS (; [id number])  G4: current  GynHx: HSV pos, denies hx of ovarian cysts, fibroids and abnormal paps  PMH: anixety, ADHD  Meds: PNV, zoloft 100mg QD, adderall 25mg QD, valtrex  All: cipro   SHx: CSx 2, D&C  SocialHx: denies EtOH, tobacco and illict drug use in pregnancy 37yo  at 37w5d who presents to L&D for rule out labor. Patient c/o incisional pain and CTX. Denies vaginal bleeding and loss of fluid. Denies HA, changes in vision, CP, SOB, n/v.    MARY LOU: 6/10/22  LMP: 9/3/21    ObHx:  G1: pCS 2/2 failure to progress (; [id number])  G2: SAB with D&C ()  G3: rCS (; [id number])  G4: current  GynHx: HSV pos, denies hx of ovarian cysts, fibroids and abnormal paps  PMH: anixety, ADHD  Meds: PNV, zoloft 100mg QD, adderall 25mg QD, valtrex  All: cipro   SHx: CSx 2, D&C  SocialHx: denies EtOH, tobacco and illicit drug use in pregnancy

## 2022-05-25 NOTE — OB PROVIDER H&P - ASSESSMENT
A/P: Pt is a 35yo who is being admitted for repeat  section @ 37w5d 2/2 labor at term.  -Admit to L&D  -Consent  -Admission labs  -NPO, except ice chips   -IV fluids  -Fetus: Cat I tracing. Continuous toco and fetal monitoring.   -preop ancef 2g  -Analgesia: spinal    Discussed with Dr. Franco

## 2022-05-25 NOTE — OB RN DELIVERY SUMMARY - NSSELHIDDEN_OBGYN_ALL_OB_FT
[NS_DeliveryRN_OBGYN_ALL_OB_FT:[id number]],[NS_DeliveryAttending1_OBGYN_ALL_OB_FT:[id number]] [NS_DeliveryRN_OBGYN_ALL_OB_FT:[id number]],[NS_DeliveryAttending1_OBGYN_ALL_OB_FT:[id number]]

## 2022-05-25 NOTE — OB RN PATIENT PROFILE - FALL HARM RISK - UNIVERSAL INTERVENTIONS
Bed in lowest position, wheels locked, appropriate side rails in place/Call bell, personal items and telephone in reach/Instruct patient to call for assistance before getting out of bed or chair/Non-slip footwear when patient is out of bed/[place] to call system/Physically safe environment - no spills, clutter or unnecessary equipment/Purposeful Proactive Rounding/Room/bathroom lighting operational, light cord in reach

## 2022-05-25 NOTE — OB PROVIDER H&P - NSHPPHYSICALEXAM_GEN_ALL_CORE
Vital Signs Last 24 Hrs  T(C): 37 (25 May 2022 17:21), Max: 37.1 (25 May 2022 17:13)  T(F): 98.6 (25 May 2022 17:21), Max: 98.8 (25 May 2022 17:13)  HR: 73 (25 May 2022 17:21) (73 - 73)  BP: 124/77 (25 May 2022 17:21) (124/77 - 124/77)  RR: 18 (25 May 2022 17:21) (18 - 18)    Gen: AAOx3  Abd: soft, gravid  Ext: no tenderness to calf palpation    SVE: 0/0/-3    FHT: baseline 120s, moderate variability, + acels, -decels  [place]: CTX q5-6min

## 2022-05-25 NOTE — OB RN INTRAOPERATIVE NOTE - NSSELHIDDEN_OBGYN_ALL_OB_FT
[NS_DeliveryRN_OBGYN_ALL_OB_FT:[id number]] [NS_DeliveryRN_OBGYN_ALL_OB_FT:[id number]],[NS_DeliveryAttending1_OBGYN_ALL_OB_FT:[id number]],[NS_DeliveryAssist1_OBGYN_ALL_OB_FT:[id number]]

## 2022-05-25 NOTE — OB PROVIDER DELIVERY SUMMARY - NSPROVIDERDELIVERYNOTE_OBGYN_ALL_OB_FT
Pre-op diagnosis: viable intrauterine pregnancy at 37 5/7 weeks gestation, labor  post-op: same   Procedure: repeat low transverse  section  Surgeon: Dr. Wolf  Assistant: Dr. Hsieh, PGY-3  Anesthesiologist: MD Mirlande; SAUL Morel  Anesthesia: Spinal  IVF: 1000cc  EBL: 575cc  UOP: 30cc  Findings: Asewviable femlae infant, apgars 9/9, weight 3340g, cephalic presentation. Grossly normal uterus, tubes, ovaries

## 2022-05-25 NOTE — OB RN DELIVERY SUMMARY - NS_SEPSISRSKCALC_OBGYN_ALL_OB_FT
EOS calculated successfully. EOS Risk Factor: 0.5/1000 live births ([facility] national incidence); GA=37w5d; Temp=98.8; ROM=0.017; GBS='Negative'; Antibiotics='No antibiotics or any antibiotics < 2 hrs prior to birth'

## 2022-05-25 NOTE — OB RN TRIAGE NOTE - NSICDXPASTMEDICALHX_GEN_ALL_CORE_FT
PAST MEDICAL HISTORY:  ADD (attention deficit disorder)     GERD (gastroesophageal reflux disease)     HSV-2 infection     Kidney stone     Migraines     Nerve damage left side faciaql pain permanent after taking cipro

## 2022-05-25 NOTE — OB RN TRIAGE NOTE - FALL HARM RISK - UNIVERSAL INTERVENTIONS
Bed in lowest position, wheels locked, appropriate side rails in place/Call bell, personal items and telephone in reach/Instruct patient to call for assistance before getting out of bed or chair/Non-slip footwear when patient is out of bed/[place] to call system/Physically safe environment - no spills, clutter or unnecessary equipment/Purposeful Proactive Rounding/Room/bathroom lighting operational, light cord in reach

## 2022-05-26 DIAGNOSIS — Z01.818 ENCOUNTER FOR OTHER PREPROCEDURAL EXAMINATION: ICD-10-CM

## 2022-05-26 LAB
BASOPHILS # BLD AUTO: 0.02 K/UL — SIGNIFICANT CHANGE UP (ref 0–0.2)
BASOPHILS NFR BLD AUTO: 0.2 % — SIGNIFICANT CHANGE UP (ref 0–2)
COVID-19 SPIKE DOMAIN AB INTERP: POSITIVE
COVID-19 SPIKE DOMAIN ANTIBODY RESULT: 117 U/ML — HIGH
EOSINOPHIL # BLD AUTO: 0 K/UL — SIGNIFICANT CHANGE UP (ref 0–0.5)
EOSINOPHIL NFR BLD AUTO: 0 % — SIGNIFICANT CHANGE UP (ref 0–6)
HCT VFR BLD CALC: 33 % — LOW (ref 34.5–45)
HGB BLD-MCNC: 10.8 G/DL — LOW (ref 11.5–15.5)
HIV 1+2 AB+HIV1 P24 AG SERPL QL IA: SIGNIFICANT CHANGE UP
IMM GRANULOCYTES NFR BLD AUTO: 0.6 % — SIGNIFICANT CHANGE UP (ref 0–1.5)
LYMPHOCYTES # BLD AUTO: 0.62 K/UL — LOW (ref 1–3.3)
LYMPHOCYTES # BLD AUTO: 4.7 % — LOW (ref 13–44)
MCHC RBC-ENTMCNC: 31.5 PG — SIGNIFICANT CHANGE UP (ref 27–34)
MCHC RBC-ENTMCNC: 32.7 GM/DL — SIGNIFICANT CHANGE UP (ref 32–36)
MCV RBC AUTO: 96.2 FL — SIGNIFICANT CHANGE UP (ref 80–100)
MONOCYTES # BLD AUTO: 0.21 K/UL — SIGNIFICANT CHANGE UP (ref 0–0.9)
MONOCYTES NFR BLD AUTO: 1.6 % — LOW (ref 2–14)
NEUTROPHILS # BLD AUTO: 12.15 K/UL — HIGH (ref 1.8–7.4)
NEUTROPHILS NFR BLD AUTO: 92.9 % — HIGH (ref 43–77)
PLATELET # BLD AUTO: 119 K/UL — LOW (ref 150–400)
RBC # BLD: 3.43 M/UL — LOW (ref 3.8–5.2)
RBC # FLD: 13.6 % — SIGNIFICANT CHANGE UP (ref 10.3–14.5)
SARS-COV-2 IGG+IGM SERPL QL IA: 117 U/ML — HIGH
SARS-COV-2 IGG+IGM SERPL QL IA: POSITIVE
SARS-COV-2 RNA SPEC QL NAA+PROBE: SIGNIFICANT CHANGE UP
T PALLIDUM AB TITR SER: NEGATIVE — SIGNIFICANT CHANGE UP
WBC # BLD: 13.08 K/UL — HIGH (ref 3.8–10.5)
WBC # FLD AUTO: 13.08 K/UL — HIGH (ref 3.8–10.5)

## 2022-05-26 RX ORDER — DIPHENHYDRAMINE HCL 50 MG
25 CAPSULE ORAL EVERY 6 HOURS
Refills: 0 | Status: DISCONTINUED | OUTPATIENT
Start: 2022-05-26 | End: 2022-05-27

## 2022-05-26 RX ORDER — DIPHENHYDRAMINE HCL 50 MG
25 CAPSULE ORAL ONCE
Refills: 0 | Status: COMPLETED | OUTPATIENT
Start: 2022-05-26 | End: 2022-05-26

## 2022-05-26 RX ORDER — IBUPROFEN 200 MG
600 TABLET ORAL EVERY 6 HOURS
Refills: 0 | Status: DISCONTINUED | OUTPATIENT
Start: 2022-05-26 | End: 2022-05-27

## 2022-05-26 RX ORDER — OXYCODONE HYDROCHLORIDE 5 MG/1
5 TABLET ORAL
Refills: 0 | Status: DISCONTINUED | OUTPATIENT
Start: 2022-05-26 | End: 2022-05-27

## 2022-05-26 RX ADMIN — Medication 975 MILLIGRAM(S): at 02:55

## 2022-05-26 RX ADMIN — Medication 975 MILLIGRAM(S): at 10:08

## 2022-05-26 RX ADMIN — Medication 30 MILLIGRAM(S): at 17:48

## 2022-05-26 RX ADMIN — SODIUM CHLORIDE 125 MILLILITER(S): 9 INJECTION, SOLUTION INTRAVENOUS at 05:58

## 2022-05-26 RX ADMIN — DEXTROAMPHETAMINE SACCHARATE, AMPHETAMINE ASPARTATE, DEXTROAMPHETAMINE SULFATE AND AMPHETAMINE SULFATE 20 MILLIGRAM(S): 1.875; 1.875; 1.875; 1.875 TABLET ORAL at 10:07

## 2022-05-26 RX ADMIN — Medication 25 MILLIGRAM(S): at 22:39

## 2022-05-26 RX ADMIN — Medication 975 MILLIGRAM(S): at 15:32

## 2022-05-26 RX ADMIN — Medication 30 MILLIGRAM(S): at 12:02

## 2022-05-26 RX ADMIN — SERTRALINE 100 MILLIGRAM(S): 25 TABLET, FILM COATED ORAL at 19:15

## 2022-05-26 RX ADMIN — Medication 25 MILLIGRAM(S): at 00:40

## 2022-05-26 RX ADMIN — Medication 975 MILLIGRAM(S): at 20:44

## 2022-05-26 RX ADMIN — ENOXAPARIN SODIUM 40 MILLIGRAM(S): 100 INJECTION SUBCUTANEOUS at 12:02

## 2022-05-26 RX ADMIN — Medication 1000 MILLIUNIT(S)/MIN: at 02:34

## 2022-05-26 RX ADMIN — Medication 1000 MILLIUNIT(S)/MIN: at 00:19

## 2022-05-26 RX ADMIN — Medication 25 MILLIGRAM(S): at 12:14

## 2022-05-26 RX ADMIN — OXYCODONE HYDROCHLORIDE 5 MILLIGRAM(S): 5 TABLET ORAL at 21:30

## 2022-05-26 RX ADMIN — OXYCODONE HYDROCHLORIDE 5 MILLIGRAM(S): 5 TABLET ORAL at 20:44

## 2022-05-26 RX ADMIN — Medication 25 MILLIGRAM(S): at 06:45

## 2022-05-26 RX ADMIN — Medication 30 MILLIGRAM(S): at 05:02

## 2022-05-26 NOTE — DISCHARGE NOTE OB - MEDICATION SUMMARY - MEDICATIONS TO TAKE
I will START or STAY ON the medications listed below when I get home from the hospital:    acetaminophen 325 mg oral tablet  -- 2 tab(s) by mouth every 6 hours -for mild pain   -- This product contains acetaminophen.  Do not use  with any other product containing acetaminophen to prevent possible liver damage.    -- Indication: For CS    ibuprofen 600 mg oral tablet  -- 1 tab(s) by mouth every 6 hours -for moderate pain MDD:4  -- Do not take this drug if you are pregnant.  It is very important that you take or use this exactly as directed.  Do not skip doses or discontinue unless directed by your doctor.  May cause drowsiness or dizziness.  Obtain medical advice before taking any non-prescription drugs as some may affect the action of this medication.  Take with food or milk.    -- Indication: For CS    oxycodone-acetaminophen 5 mg-325 mg oral tablet  -- 1 tab(s) by mouth every 6 hours, As Needed -for severe pain MDD:4  -- Caution federal law prohibits the transfer of this drug to any person other  than the person for whom it was prescribed.  May cause drowsiness.  Alcohol may intensify this effect.  Use care when operating dangerous machinery.  This prescription cannot be refilled.  This product contains acetaminophen.  Do not use  with any other product containing acetaminophen to prevent possible liver damage.  Using more of this medication than prescribed may cause serious breathing problems.    -- Indication: For CS

## 2022-05-26 NOTE — DISCHARGE NOTE OB - CARE PROVIDER_API CALL
Marcell Wolf)  Obstetrics and Gynecology  [address], [facility] [address] [place] [address]  Phone: [phone]  Fax: [phone]  Follow Up Time:

## 2022-05-26 NOTE — CHART NOTE - NSCHARTNOTEFT_GEN_A_CORE
Nurse contacted night team because patient's island dressing was saturated. Patient seen and examined at bedside. Patient reports feeling well overall. Island dressing with serosanguinous drainage. No signs of active bleeding from incision. Pressure dressing reapplied. Abdominal binder given. Will continue to monitor    Discussed with Dr. Causey

## 2022-05-26 NOTE — DISCHARGE NOTE OB - NS MD DC FALL RISK RISK
For information on Fall & Injury Prevention, visit: [email or web address] OR  [email or web address] OR  https://www.cdc.gov/steadi/patient.html

## 2022-05-26 NOTE — DISCHARGE NOTE OB - PATIENT PORTAL LINK FT
You can access the FollowMyHealth Patient Portal offered by [facility] by registering at the following website: [email or web address]. By joining [employer]’s FollowMyHealth portal, you will also be able to view your health information using other applications (apps) compatible with our system.

## 2022-05-26 NOTE — DISCHARGE NOTE OB - PLAN OF CARE
AoR form received from patient/patient's wife (April). Waiting to receive signed appeal letter from provider.    Please call your provider to schedule postoperative wound check visit in 2 weeks. Take medications as directed, regular diet, activity as tolerated. Exclusive breast feeding for the first 6 months is recommended. Nothing per vagina for 6 weeks (incl. sex, douching, etc). If you have additional concerns, please inform your provider.

## 2022-05-26 NOTE — DISCHARGE NOTE OB - CARE PLAN
1 Principal Discharge DX:	 delivery delivered  Assessment and plan of treatment:	Please call your provider to schedule postoperative wound check visit in 2 weeks. Take medications as directed, regular diet, activity as tolerated. Exclusive breast feeding for the first 6 months is recommended. Nothing per vagina for 6 weeks (incl. sex, douching, etc). If you have additional concerns, please inform your provider.

## 2022-05-26 NOTE — DISCHARGE NOTE OB - HOSPITAL COURSE
Pt is 37yo  s/p  section. She was transferred to postpartum unit without complications during her stay. Upon discharge she is voiding, tolerating PO, ambulating, and pain is well controlled.

## 2022-05-26 NOTE — PROGRESS NOTE ADULT - ASSESSMENT
A/P: HAMILTON MOTLEY is a 36y  s/p rCS in labor  POD1  - Stable, doing well postpartum  - Hgb 10.9 > 10.8  - Pain: well controlled on PO pain meds  - GI: regular diet  - : Pending TOV  - DVT ppx: Lovenox  - Dispo: Continue inpatient care A/P: HAMILTON MTOLEY is a 36y  s/p rCS in labor  POD1  - Stable, doing well postpartum  - Hgb 10.9 > 10.8  - Pain: well controlled on PO pain meds  - GI: regular diet  - : Pending TOV  - DVT ppx: Lovenox  - Dispo: Continue inpatient care    Addendum:    Subjective Hx, Physical Exam, & Laboratory results reviewed and Pt seen and examined at bedside.  I agree with the Resident Physician's assessment and plan of care, as discussed above.  She was given the opportunity to ask questions and all were addressed.    Trey Causey, DO

## 2022-05-27 VITALS
HEART RATE: 71 BPM | RESPIRATION RATE: 18 BRPM | TEMPERATURE: 98 F | OXYGEN SATURATION: 99 % | DIASTOLIC BLOOD PRESSURE: 65 MMHG | SYSTOLIC BLOOD PRESSURE: 111 MMHG

## 2022-05-27 PROCEDURE — 86901 BLOOD TYPING SEROLOGIC RH(D): CPT

## 2022-05-27 PROCEDURE — U0005: CPT

## 2022-05-27 PROCEDURE — 85025 COMPLETE CBC W/AUTO DIFF WBC: CPT

## 2022-05-27 PROCEDURE — U0003: CPT

## 2022-05-27 PROCEDURE — G0463: CPT

## 2022-05-27 PROCEDURE — 86850 RBC ANTIBODY SCREEN: CPT

## 2022-05-27 PROCEDURE — 86769 SARS-COV-2 COVID-19 ANTIBODY: CPT

## 2022-05-27 PROCEDURE — 59050 FETAL MONITOR W/REPORT: CPT

## 2022-05-27 PROCEDURE — 81001 URINALYSIS AUTO W/SCOPE: CPT

## 2022-05-27 PROCEDURE — 86900 BLOOD TYPING SEROLOGIC ABO: CPT

## 2022-05-27 PROCEDURE — 36415 COLL VENOUS BLD VENIPUNCTURE: CPT

## 2022-05-27 PROCEDURE — 86780 TREPONEMA PALLIDUM: CPT

## 2022-05-27 PROCEDURE — 87389 HIV-1 AG W/HIV-1&-2 AB AG IA: CPT

## 2022-05-27 PROCEDURE — 59025 FETAL NON-STRESS TEST: CPT

## 2022-05-27 PROCEDURE — 86703 HIV-1/HIV-2 1 RESULT ANTBDY: CPT

## 2022-05-27 RX ORDER — ACETAMINOPHEN 500 MG
2 TABLET ORAL
Qty: 56 | Refills: 0
Start: 2022-05-27 | End: 2022-06-02

## 2022-05-27 RX ORDER — IBUPROFEN 200 MG
1 TABLET ORAL
Qty: 28 | Refills: 0
Start: 2022-05-27 | End: 2022-06-02

## 2022-05-27 RX ADMIN — ENOXAPARIN SODIUM 40 MILLIGRAM(S): 100 INJECTION SUBCUTANEOUS at 12:45

## 2022-05-27 RX ADMIN — Medication 600 MILLIGRAM(S): at 06:16

## 2022-05-27 RX ADMIN — Medication 600 MILLIGRAM(S): at 00:30

## 2022-05-27 RX ADMIN — Medication 975 MILLIGRAM(S): at 02:55

## 2022-05-27 RX ADMIN — Medication 600 MILLIGRAM(S): at 12:45

## 2022-05-27 RX ADMIN — OXYCODONE HYDROCHLORIDE 5 MILLIGRAM(S): 5 TABLET ORAL at 13:30

## 2022-05-27 RX ADMIN — DEXTROAMPHETAMINE SACCHARATE, AMPHETAMINE ASPARTATE, DEXTROAMPHETAMINE SULFATE AND AMPHETAMINE SULFATE 20 MILLIGRAM(S): 1.875; 1.875; 1.875; 1.875 TABLET ORAL at 10:50

## 2022-05-27 RX ADMIN — OXYCODONE HYDROCHLORIDE 5 MILLIGRAM(S): 5 TABLET ORAL at 12:46

## 2022-05-27 RX ADMIN — Medication 975 MILLIGRAM(S): at 09:20

## 2022-05-27 NOTE — PROGRESS NOTE ADULT - ASSESSMENT
A/P: HAMILTON MOTLEY is a 36y  s/p rCS in labor  POD2  - Stable, doing well postpartum  - Hgb 10.9 > 10.8  - Pain: well controlled on PO pain meds  - GI: regular diet  - : spontaneously voiding  - DVT ppx: Lovenox  - Dispo: Anticipate discharge today pending attending rounds       A/P: HAMILTON MOTLEY is a 36y  s/p rCS in labor  POD2  - Stable, doing well postpartum  - Hgb 10.9 > 10.8  - Pain: well controlled on PO pain meds  - GI: regular diet  - : spontaneously voiding  - DVT ppx: Lovenox  - Dispo: Anticipate discharge today pending attending rounds    Addendum:    Subjective Hx, Physical Exam, & Laboratory results reviewed and Pt seen and examined at bedside.  I agree with the Resident Physician's assessment and plan of care, as discussed above.  She was given the opportunity to ask questions and all were addressed.    Trey Causey, DO

## 2022-05-27 NOTE — PROGRESS NOTE ADULT - SUBJECTIVE AND OBJECTIVE BOX
HAMILTON MOTLEY is a 36y  s/p rCS in labor  POD2    SUBJECTIVE:  No acute events overnight, patient has no complaints.  Pain is well controlled with PRN pain medication.  She is ambulating, voiding, tolerating PO. Denies N/V  pos flatus, neg BM.  minimal lochia.    OBJECTIVE:  Physical exam:  General: AOx3, NAD.  Heart: RRR  Lungs: CTAB  Abdomen: Soft, appropriately tender to palpitation, firm uterine fundus at umbilicus. Incision is clean dry and intact.  Vaginal: minimal blood on pad, no bleeding on palpation of fundus  Ext: No DVT signs, warm extremities.    Vital Signs Last 24 Hrs  Vital Signs Last 24 Hrs  T(C): 36.7 (27 May 2022 04:00), Max: 36.7 (26 May 2022 11:30)  T(F): 98 (27 May 2022 04:00), Max: 98.1 (26 May 2022 11:30)  HR: 71 (27 May 2022 04:00) (63 - 71)  BP: 111/65 (27 May 2022 04:00) (95/63 - 114/67)  RR: 18 (27 May 2022 04:00) (18 - 18)  SpO2: 99% (27 May 2022 04:00) (97% - 100%)  
HAMILTON MOTLEY is a 36y  s/p rCS in labor  POD1    SUBJECTIVE:  No acute events overnight, patient has no complaints.  Pain is well controlled with PRN pain medication.  She is ambulating, pending TOV, tolerating PO. Denies N/V  neg flatus, neg BM.  minimal lochia.    OBJECTIVE:  Physical exam:  General: AOx3, NAD.  Heart: RRR  Lungs: CTAB  Abdomen: Soft, appropriately tender to palpitation, firm uterine fundus at umbilicus. Incision is clean dry and intact.  Vaginal: minimal blood on pad, no bleeding on palpation of fundus  Ext: No DVT signs, warm extremities.    Vital Signs Last 24 Hrs  T(C): 36.9 (26 May 2022 05:54), Max: 37.1 (25 May 2022 17:13)  T(F): 98.4 (26 May 2022 05:54), Max: 98.8 (25 May 2022 17:13)  HR: 70 (26 May 2022 05:54) (57 - 83)  BP: 114/64 (26 May 2022 05:54) (96/57 - 124/77)  BP(mean): --  RR: 18 (26 May 2022 05:54) (16 - 18)  SpO2: 96% (26 May 2022 05:54) (87% - 100%)    LABS:                        10.8   13.08 )-----------( 119      ( 26 May 2022 05:59 )             33.0

## 2022-06-01 PROBLEM — G43.909 MIGRAINE, UNSPECIFIED, NOT INTRACTABLE, WITHOUT STATUS MIGRAINOSUS: Chronic | Status: ACTIVE | Noted: 2022-05-25

## 2022-06-01 PROBLEM — K21.9 GASTRO-ESOPHAGEAL REFLUX DISEASE WITHOUT ESOPHAGITIS: Chronic | Status: ACTIVE | Noted: 2022-05-25

## 2022-06-01 PROBLEM — B00.9 HERPESVIRAL INFECTION, UNSPECIFIED: Chronic | Status: ACTIVE | Noted: 2022-05-25

## 2022-06-02 ENCOUNTER — APPOINTMENT (OUTPATIENT)
Dept: OBGYN | Facility: CLINIC | Age: 37
End: 2022-06-02
Payer: MEDICAID

## 2022-06-02 VITALS
DIASTOLIC BLOOD PRESSURE: 60 MMHG | WEIGHT: 177 LBS | HEIGHT: 67 IN | BODY MASS INDEX: 27.78 KG/M2 | SYSTOLIC BLOOD PRESSURE: 90 MMHG

## 2022-06-02 PROCEDURE — 0503F POSTPARTUM CARE VISIT: CPT

## 2022-06-02 RX ORDER — SERTRALINE HYDROCHLORIDE 100 MG/1
100 TABLET, FILM COATED ORAL
Qty: 60 | Refills: 1 | Status: ACTIVE | COMMUNITY
Start: 2022-03-10 | End: 1900-01-01

## 2022-06-02 NOTE — HISTORY OF PRESENT ILLNESS
[Delivery Date: ___] : on [unfilled] [Repeat C/S] : delivered by  section (repeat) [Female] : Delivery History: baby girl [Breastfeeding] : currently nursing [None] : The patient is currently asymptomatic [S/Sx PP Depression] : no signs/symptoms of postpartum depression [Clean/Dry/Intact] : clean, dry and intact [Doing Well] : is doing well

## 2022-06-06 RX ORDER — DEXTROAMPHETAMINE SACCHARATE, AMPHETAMINE ASPARTATE MONOHYDRATE, DEXTROAMPHETAMINE SULFATE AND AMPHETAMINE SULFATE 5; 5; 5; 5 MG/1; MG/1; MG/1; MG/1
20 CAPSULE, EXTENDED RELEASE ORAL
Qty: 30 | Refills: 0 | Status: ACTIVE | COMMUNITY
Start: 2019-01-11 | End: 1900-01-01

## 2022-06-08 ENCOUNTER — NON-APPOINTMENT (OUTPATIENT)
Age: 37
End: 2022-06-08

## 2022-07-05 ENCOUNTER — NON-APPOINTMENT (OUTPATIENT)
Age: 37
End: 2022-07-05

## 2022-07-06 ENCOUNTER — APPOINTMENT (OUTPATIENT)
Dept: OBGYN | Facility: CLINIC | Age: 37
End: 2022-07-06

## 2022-07-06 VITALS
DIASTOLIC BLOOD PRESSURE: 70 MMHG | SYSTOLIC BLOOD PRESSURE: 110 MMHG | WEIGHT: 170 LBS | BODY MASS INDEX: 26.68 KG/M2 | HEIGHT: 67 IN

## 2022-07-06 DIAGNOSIS — Z30.09 ENCOUNTER FOR OTHER GENERAL COUNSELING AND ADVICE ON CONTRACEPTION: ICD-10-CM

## 2022-07-06 DIAGNOSIS — T81.89XA OTHER COMPLICATIONS OF PROCEDURES, NOT ELSEWHERE CLASSIFIED, INITIAL ENCOUNTER: ICD-10-CM

## 2022-07-06 PROCEDURE — 0503F POSTPARTUM CARE VISIT: CPT

## 2022-07-06 NOTE — HISTORY OF PRESENT ILLNESS
Mom notified. Per her request medication ordered at [facility] in [place]. [FreeTextEntry1] : 36-year-old white female para 3 status post repeat  section on 2022 here for follow-up visit.  Patient is reporting a small amount of separation on her  incision.  She denies any fevers or chills.  She had very scant amount of drainage from there.\par \par Patient also wants to discuss birth control.  She is currently breast-feeding and is undecided about future fertility.  She denies any depression.  Patient does have a history of ADHD and takes medication for this.

## 2022-07-06 NOTE — PHYSICAL EXAM
[FreeTextEntry7] : 1 cm area of separation on incision; no drainage, no cellulitis noted; no abscess noted

## 2022-07-06 NOTE — DISCUSSION/SUMMARY
[FreeTextEntry1] : I reassured the patient regarding the incision and I advised her that the best course is expectant management.  She does not appear to have a collection that can be drained underneath.  I did  her that multiple surgeries through the same incision can sometimes slow down wound healing.  As mentioned there is no evidence of infection.\par \par Regarding birth control I had an extensive discussion.  I discussed considering oral contraceptives, IUDs and permanent methods.  I did  her that she is not a great candidate for tubal ligation since she has had 3  and as mentioned she is undecided about future fertility.  For now patient opts to go on the progesterone only pill and is strongly considering the IUD.  Different IUDs were discussed at length and literature was provided.

## 2022-07-26 NOTE — OB RN PATIENT PROFILE - NS PRO TALK SOMEONE YN
From: Stephanie Dean  To: Conchita Ashley  Sent: 7/26/2022 12:14 PM CDT  Subject: Pain/refill    Good morning  You advised me to let you know when I’m running low on pain pills and then you’d start me on the new pain meds. The pain has been HORRIBLE I can’t even go upstairs to my own room. I have made an appointment with pain management with hopes of better relief.    no

## 2022-08-30 RX ORDER — NORETHINDRONE 0.35 MG/1
0.35 TABLET ORAL DAILY
Qty: 3 | Refills: 0 | Status: ACTIVE | COMMUNITY
Start: 2022-07-13 | End: 1900-01-01

## 2022-09-08 NOTE — OB PROVIDER DELIVERY SUMMARY - NSPOSTOPDXA_OBGYN_ALL_OB
Same as Pre-Op Diagnosis Colchicine Counseling:  Patient counseled regarding adverse effects including but not limited to stomach upset (nausea, vomiting, stomach pain, or diarrhea).  Patient instructed to limit alcohol consumption while taking this medication.  Colchicine may reduce blood counts especially with prolonged use.  The patient understands that monitoring of kidney function and blood counts may be required, especially at baseline. The patient verbalized understanding of the proper use and possible adverse effects of colchicine.  All of the patient's questions and concerns were addressed.

## 2023-01-05 ENCOUNTER — RX RENEWAL (OUTPATIENT)
Age: 38
End: 2023-01-05

## 2023-03-27 ENCOUNTER — RX RENEWAL (OUTPATIENT)
Age: 38
End: 2023-03-27

## 2023-03-27 RX ORDER — NORETHINDRONE 0.35 MG/1
0.35 TABLET ORAL
Qty: 84 | Refills: 0 | Status: ACTIVE | COMMUNITY
Start: 2023-01-05 | End: 1900-01-01

## 2023-08-04 NOTE — ED PROVIDER NOTE - TEMPLATE, MLM
Abdominal Pain, N/V/D I personally performed the service described in the documentation recorded by the scribe in my presence, and it accurately and completely records my words and actions.

## 2024-03-29 NOTE — OB RN PATIENT PROFILE - NS_PRENTALCLSTYPE_OBGYN_ALL_OB
Pt to and from CT on cardiac monitoring without incident. Pt back in room on cardiac monitoring, changed into a gown. Wife at bedside. Neuro assessment remains unchanged. sent in from [place] with worsening renal failure dehydration Yes

## 2024-09-20 ENCOUNTER — APPOINTMENT (OUTPATIENT)
Dept: DERMATOLOGY | Facility: CLINIC | Age: 39
End: 2024-09-20

## 2024-10-30 ENCOUNTER — APPOINTMENT (OUTPATIENT)
Age: 39
End: 2024-10-30
Payer: MEDICAID

## 2024-10-30 VITALS
DIASTOLIC BLOOD PRESSURE: 73 MMHG | SYSTOLIC BLOOD PRESSURE: 106 MMHG | WEIGHT: 145 LBS | BODY MASS INDEX: 22.76 KG/M2 | HEIGHT: 67 IN

## 2024-10-30 DIAGNOSIS — Z98.82 BREAST IMPLANT STATUS: ICD-10-CM

## 2024-10-30 DIAGNOSIS — Z01.419 ENCOUNTER FOR GYNECOLOGICAL EXAMINATION (GENERAL) (ROUTINE) W/OUT ABNORMAL FINDINGS: ICD-10-CM

## 2024-10-30 DIAGNOSIS — R39.9 UNSPECIFIED SYMPTOMS AND SIGNS INVOLVING THE GENITOURINARY SYSTEM: ICD-10-CM

## 2024-10-30 PROCEDURE — 99395 PREV VISIT EST AGE 18-39: CPT

## 2024-10-30 RX ORDER — SULFAMETHOXAZOLE AND TRIMETHOPRIM 800; 160 MG/1; MG/1
800-160 TABLET ORAL TWICE DAILY
Qty: 6 | Refills: 1 | Status: ACTIVE | COMMUNITY
Start: 2024-10-30 | End: 1900-01-01

## 2024-10-31 LAB — HPV HIGH+LOW RISK DNA PNL CVX: NOT DETECTED

## 2024-11-01 LAB
C TRACH RRNA SPEC QL NAA+PROBE: NOT DETECTED
N GONORRHOEA RRNA SPEC QL NAA+PROBE: NOT DETECTED
SOURCE TP AMPLIFICATION: NORMAL

## 2024-11-04 LAB
BACTERIA UR CULT: ABNORMAL
CYTOLOGY CVX/VAG DOC THIN PREP: NORMAL

## 2024-11-04 RX ORDER — CEPHALEXIN 500 MG/1
500 CAPSULE ORAL 3 TIMES DAILY
Qty: 21 | Refills: 1 | Status: ACTIVE | COMMUNITY
Start: 2024-11-04 | End: 1900-01-01

## 2024-11-26 NOTE — OB PROVIDER H&P - NS_PRENATALLABSOURCEGBS36PN_OBGYN_ALL_OB
[facility]  called and said they are not open until Dec 3.  I called dad and asked him if he wanted a referral to [facility] and he said that they went ahead and got a referral from one of the pt other Doctors as they had called today to our office and was told no referral had been placed yet.   
negative

## 2024-12-05 ENCOUNTER — APPOINTMENT (OUTPATIENT)
Dept: DERMATOLOGY | Facility: CLINIC | Age: 39
End: 2024-12-05
Payer: MEDICAID

## 2024-12-05 PROCEDURE — 99203 OFFICE O/P NEW LOW 30 MIN: CPT

## 2025-03-05 NOTE — OB PST NOTE - HEIGHT IN CM
MICHAEL: 4/9/2025 35w0d  GDM, testing 2x/day     Current Regimen -   Metformin ER 1000 mg at dinner        
170.18

## 2025-06-03 NOTE — OB PROVIDER TRIAGE NOTE - NSICDXPASTMEDICALHX_GEN_ALL_CORE_FT
16 PAST MEDICAL HISTORY:  ADD (attention deficit disorder)     Kidney stone     Nerve damage left side faciaql pain permanent after taking cipro
